# Patient Record
Sex: MALE | Race: BLACK OR AFRICAN AMERICAN | NOT HISPANIC OR LATINO | Employment: OTHER | URBAN - METROPOLITAN AREA
[De-identification: names, ages, dates, MRNs, and addresses within clinical notes are randomized per-mention and may not be internally consistent; named-entity substitution may affect disease eponyms.]

---

## 2023-03-23 DIAGNOSIS — I10 ESSENTIAL HYPERTENSION, BENIGN: ICD-10-CM

## 2023-03-23 RX ORDER — LOSARTAN POTASSIUM 100 MG/1
100 TABLET ORAL DAILY
COMMUNITY
Start: 2013-03-07 | End: 2023-03-23 | Stop reason: SDUPTHER

## 2023-03-24 RX ORDER — LOSARTAN POTASSIUM 100 MG/1
100 TABLET ORAL DAILY
Qty: 90 TABLET | Refills: 3 | Status: SHIPPED | OUTPATIENT
Start: 2023-03-24

## 2023-05-22 DIAGNOSIS — E78.5 HYPERLIPIDEMIA, UNSPECIFIED HYPERLIPIDEMIA TYPE: Primary | ICD-10-CM

## 2023-05-24 RX ORDER — ROSUVASTATIN CALCIUM 5 MG/1
5 TABLET, COATED ORAL DAILY
Qty: 90 TABLET | Refills: 2 | Status: SHIPPED | OUTPATIENT
Start: 2023-05-24 | End: 2024-03-13 | Stop reason: SDUPTHER

## 2023-09-24 DIAGNOSIS — E11.9 TYPE 2 DIABETES MELLITUS WITHOUT COMPLICATION, WITHOUT LONG-TERM CURRENT USE OF INSULIN (MULTI): ICD-10-CM

## 2023-09-24 RX ORDER — BLOOD-GLUCOSE METER
EACH MISCELLANEOUS
COMMUNITY
End: 2023-09-24 | Stop reason: SDUPTHER

## 2023-09-25 RX ORDER — BLOOD-GLUCOSE METER
EACH MISCELLANEOUS
Qty: 300 STRIP | Refills: 0 | Status: SHIPPED | OUTPATIENT
Start: 2023-09-25 | End: 2023-12-29 | Stop reason: SDUPTHER

## 2023-10-05 ENCOUNTER — TELEPHONE (OUTPATIENT)
Dept: ENDOCRINOLOGY | Facility: CLINIC | Age: 73
End: 2023-10-05
Payer: MEDICARE

## 2023-10-05 DIAGNOSIS — E11.9 TYPE 2 DIABETES MELLITUS WITHOUT COMPLICATION, WITHOUT LONG-TERM CURRENT USE OF INSULIN (MULTI): Primary | ICD-10-CM

## 2023-10-06 ENCOUNTER — LAB (OUTPATIENT)
Dept: LAB | Facility: LAB | Age: 73
End: 2023-10-06
Payer: MEDICARE

## 2023-10-06 DIAGNOSIS — E11.9 TYPE 2 DIABETES MELLITUS WITHOUT COMPLICATION, WITHOUT LONG-TERM CURRENT USE OF INSULIN (MULTI): ICD-10-CM

## 2023-10-06 LAB
ALBUMIN SERPL BCP-MCNC: 4.2 G/DL (ref 3.4–5)
ALP SERPL-CCNC: 71 U/L (ref 33–136)
ALT SERPL W P-5'-P-CCNC: 15 U/L (ref 10–52)
ANION GAP SERPL CALC-SCNC: 15 MMOL/L (ref 10–20)
AST SERPL W P-5'-P-CCNC: 22 U/L (ref 9–39)
BILIRUB SERPL-MCNC: 0.6 MG/DL (ref 0–1.2)
BUN SERPL-MCNC: 20 MG/DL (ref 6–23)
CALCIUM SERPL-MCNC: 9.1 MG/DL (ref 8.6–10.6)
CHLORIDE SERPL-SCNC: 108 MMOL/L (ref 98–107)
CO2 SERPL-SCNC: 24 MMOL/L (ref 21–32)
CREAT SERPL-MCNC: 1.54 MG/DL (ref 0.5–1.3)
EST. AVERAGE GLUCOSE BLD GHB EST-MCNC: 140 MG/DL
GFR SERPL CREATININE-BSD FRML MDRD: 47 ML/MIN/1.73M*2
GLUCOSE SERPL-MCNC: 80 MG/DL (ref 74–99)
HBA1C MFR BLD: 6.5 %
POTASSIUM SERPL-SCNC: 4.2 MMOL/L (ref 3.5–5.3)
PROT SERPL-MCNC: 7.3 G/DL (ref 6.4–8.2)
SODIUM SERPL-SCNC: 143 MMOL/L (ref 136–145)

## 2023-10-06 PROCEDURE — 83036 HEMOGLOBIN GLYCOSYLATED A1C: CPT

## 2023-10-06 PROCEDURE — 36415 COLL VENOUS BLD VENIPUNCTURE: CPT

## 2023-10-06 PROCEDURE — 80053 COMPREHEN METABOLIC PANEL: CPT

## 2023-10-09 PROBLEM — E11.3299 MILD NONPROLIFERATIVE DIABETIC RETINOPATHY (MULTI): Status: ACTIVE | Noted: 2023-10-09

## 2023-10-09 PROBLEM — K92.1 BLOOD IN STOOL: Status: ACTIVE | Noted: 2023-10-09

## 2023-10-09 PROBLEM — E55.9 VITAMIN D DEFICIENCY, UNSPECIFIED: Status: ACTIVE | Noted: 2023-10-09

## 2023-10-09 PROBLEM — D64.9 ANEMIA, UNSPECIFIED: Status: ACTIVE | Noted: 2023-10-09

## 2023-10-09 PROBLEM — K21.9 CHRONIC GERD: Status: ACTIVE | Noted: 2023-10-09

## 2023-10-09 PROBLEM — N18.30 CKD STAGE 3 DUE TO TYPE 2 DIABETES MELLITUS (MULTI): Status: ACTIVE | Noted: 2023-10-09

## 2023-10-09 PROBLEM — N25.81 SECONDARY RENAL HYPERPARATHYROIDISM (MULTI): Status: ACTIVE | Noted: 2023-10-09

## 2023-10-09 PROBLEM — R97.20 BPH WITH ELEVATED PSA: Status: ACTIVE | Noted: 2023-10-09

## 2023-10-09 PROBLEM — R94.31 ABNORMAL FINDING ON EKG: Status: ACTIVE | Noted: 2023-10-09

## 2023-10-09 PROBLEM — E11.9 DIABETES MELLITUS (MULTI): Status: ACTIVE | Noted: 2023-10-09

## 2023-10-09 PROBLEM — N40.1 ENLARGED PROSTATE WITH LOWER URINARY TRACT SYMPTOMS (LUTS): Status: ACTIVE | Noted: 2023-10-09

## 2023-10-09 PROBLEM — R74.01 ELEVATED TRANSAMINASE LEVEL: Status: ACTIVE | Noted: 2023-10-09

## 2023-10-09 PROBLEM — D17.0 LIPOMA OF NECK: Status: ACTIVE | Noted: 2023-10-09

## 2023-10-09 PROBLEM — I10 BENIGN ESSENTIAL HYPERTENSION: Status: ACTIVE | Noted: 2023-10-09

## 2023-10-09 PROBLEM — M10.9 GOUT: Status: ACTIVE | Noted: 2023-10-09

## 2023-10-09 PROBLEM — D47.2 MONOCLONAL (M) PROTEIN DISEASE, MULTIPLE 'M' PROTEIN: Status: ACTIVE | Noted: 2023-10-09

## 2023-10-09 PROBLEM — N40.0 BPH WITH ELEVATED PSA: Status: ACTIVE | Noted: 2023-10-09

## 2023-10-09 PROBLEM — E66.01 MORBIDLY OBESE (MULTI): Status: ACTIVE | Noted: 2023-10-09

## 2023-10-09 PROBLEM — E78.5 HYPERLIPIDEMIA: Status: ACTIVE | Noted: 2023-10-09

## 2023-10-09 PROBLEM — B96.81 HELICOBACTER PYLORI GASTRITIS: Status: ACTIVE | Noted: 2023-10-09

## 2023-10-09 PROBLEM — E11.22 CKD STAGE 3 DUE TO TYPE 2 DIABETES MELLITUS (MULTI): Status: ACTIVE | Noted: 2023-10-09

## 2023-10-09 PROBLEM — C61 ADENOCARCINOMA OF PROSTATE (MULTI): Status: ACTIVE | Noted: 2023-10-09

## 2023-10-09 PROBLEM — K29.70 HELICOBACTER PYLORI GASTRITIS: Status: ACTIVE | Noted: 2023-10-09

## 2023-10-09 RX ORDER — IRON POLYSACCHARIDE COMPLEX 150 MG
150 CAPSULE ORAL DAILY
COMMUNITY
Start: 2023-09-06 | End: 2024-01-30 | Stop reason: SDUPTHER

## 2023-10-09 RX ORDER — SELENIUM 200 MCG
TABLET ORAL
COMMUNITY
Start: 2020-08-31

## 2023-10-09 RX ORDER — CYANOCOBALAMIN (VITAMIN B-12) 500 MCG
1 TABLET ORAL DAILY
COMMUNITY
Start: 2020-07-06

## 2023-10-09 RX ORDER — LATANOPROST 50 UG/ML
SOLUTION/ DROPS OPHTHALMIC
COMMUNITY
Start: 2017-02-13

## 2023-10-09 RX ORDER — EMPAGLIFLOZIN 10 MG/1
1 TABLET, FILM COATED ORAL DAILY
COMMUNITY
Start: 2022-01-31 | End: 2023-11-20

## 2023-10-09 RX ORDER — LISINOPRIL 20 MG/1
20 TABLET ORAL DAILY
COMMUNITY
Start: 2009-05-05 | End: 2023-10-10

## 2023-10-09 RX ORDER — NAPROXEN SODIUM 220 MG/1
81 TABLET, FILM COATED ORAL DAILY
COMMUNITY

## 2023-10-09 RX ORDER — DULAGLUTIDE 1.5 MG/.5ML
1.5 INJECTION, SOLUTION SUBCUTANEOUS
COMMUNITY
Start: 2019-05-02 | End: 2024-05-02 | Stop reason: SDUPTHER

## 2023-10-09 RX ORDER — METFORMIN HYDROCHLORIDE 500 MG/1
500 TABLET ORAL 2 TIMES DAILY
COMMUNITY
Start: 2009-05-05

## 2023-10-09 RX ORDER — ALLOPURINOL 100 MG/1
2 TABLET ORAL DAILY
COMMUNITY
Start: 2016-11-02 | End: 2023-12-27

## 2023-10-09 RX ORDER — ACETAMINOPHEN 500 MG
TABLET ORAL
COMMUNITY

## 2023-10-09 RX ORDER — BRIMONIDINE TARTRATE 2 MG/ML
1 SOLUTION/ DROPS OPHTHALMIC 2 TIMES DAILY
COMMUNITY

## 2023-10-09 RX ORDER — LANCETS 33 GAUGE
EACH MISCELLANEOUS 3 TIMES DAILY
COMMUNITY
Start: 2022-11-02

## 2023-10-09 RX ORDER — ZINC GLUCONATE 50 MG
TABLET ORAL
COMMUNITY

## 2023-10-09 RX ORDER — LANOLIN ALCOHOL/MO/W.PET/CERES
1 CREAM (GRAM) TOPICAL DAILY
COMMUNITY
End: 2024-01-30 | Stop reason: SDUPTHER

## 2023-10-09 RX ORDER — GLIPIZIDE 10 MG/1
15 TABLET ORAL
COMMUNITY
Start: 2009-05-05 | End: 2023-10-10 | Stop reason: ALTCHOICE

## 2023-10-09 RX ORDER — DORZOLAMIDE HYDROCHLORIDE AND TIMOLOL MALEATE 20; 5 MG/ML; MG/ML
SOLUTION/ DROPS OPHTHALMIC
COMMUNITY
Start: 2018-03-20

## 2023-10-09 RX ORDER — DICLOFENAC SODIUM 75 MG/1
75 TABLET, DELAYED RELEASE ORAL 2 TIMES DAILY PRN
COMMUNITY
Start: 2017-09-03 | End: 2023-10-10 | Stop reason: ALTCHOICE

## 2023-10-09 RX ORDER — GLIPIZIDE 5 MG/1
1 TABLET ORAL DAILY
COMMUNITY
Start: 2012-03-29 | End: 2024-03-13

## 2023-10-10 ENCOUNTER — OFFICE VISIT (OUTPATIENT)
Dept: ENDOCRINOLOGY | Facility: CLINIC | Age: 73
End: 2023-10-10
Payer: MEDICARE

## 2023-10-10 VITALS
SYSTOLIC BLOOD PRESSURE: 124 MMHG | WEIGHT: 212.8 LBS | DIASTOLIC BLOOD PRESSURE: 64 MMHG | BODY MASS INDEX: 31.52 KG/M2 | HEART RATE: 57 BPM | RESPIRATION RATE: 14 BRPM | HEIGHT: 69 IN

## 2023-10-10 DIAGNOSIS — I10 BENIGN ESSENTIAL HYPERTENSION: ICD-10-CM

## 2023-10-10 DIAGNOSIS — E78.5 HYPERLIPIDEMIA, UNSPECIFIED HYPERLIPIDEMIA TYPE: ICD-10-CM

## 2023-10-10 DIAGNOSIS — E11.9 TYPE 2 DIABETES MELLITUS WITHOUT COMPLICATION, WITHOUT LONG-TERM CURRENT USE OF INSULIN (MULTI): Primary | ICD-10-CM

## 2023-10-10 PROCEDURE — 3074F SYST BP LT 130 MM HG: CPT | Performed by: INTERNAL MEDICINE

## 2023-10-10 PROCEDURE — 99214 OFFICE O/P EST MOD 30 MIN: CPT | Performed by: INTERNAL MEDICINE

## 2023-10-10 PROCEDURE — 1159F MED LIST DOCD IN RCRD: CPT | Performed by: INTERNAL MEDICINE

## 2023-10-10 PROCEDURE — 4010F ACE/ARB THERAPY RXD/TAKEN: CPT | Performed by: INTERNAL MEDICINE

## 2023-10-10 PROCEDURE — 1160F RVW MEDS BY RX/DR IN RCRD: CPT | Performed by: INTERNAL MEDICINE

## 2023-10-10 PROCEDURE — 1126F AMNT PAIN NOTED NONE PRSNT: CPT | Performed by: INTERNAL MEDICINE

## 2023-10-10 PROCEDURE — 3078F DIAST BP <80 MM HG: CPT | Performed by: INTERNAL MEDICINE

## 2023-10-10 PROCEDURE — 3044F HG A1C LEVEL LT 7.0%: CPT | Performed by: INTERNAL MEDICINE

## 2023-10-10 PROCEDURE — 1036F TOBACCO NON-USER: CPT | Performed by: INTERNAL MEDICINE

## 2023-10-10 ASSESSMENT — ENCOUNTER SYMPTOMS
VOMITING: 0
SHORTNESS OF BREATH: 0
NAUSEA: 0
DIZZINESS: 0
LIGHT-HEADEDNESS: 0
FEVER: 0
CHILLS: 0
DIARRHEA: 0

## 2023-10-10 NOTE — PATIENT INSTRUCTIONS
Keep up the good work  No change to meds  Follow up in 6 months  Call with any concerns regarding sugars or meds

## 2023-10-10 NOTE — PROGRESS NOTES
"Subjective   Patient ID: Ismael Haley is a 72 y.o. male who presents for Diabetes, Hyperlipidemia, and Hypertension.  HPI  Since last visit in January doing great.   Sugars excellent.  One low but was explainable.  UTD with eye exam.  Feels well.  No issues with meds    Review of Systems   Constitutional:  Negative for chills and fever.   Respiratory:  Negative for shortness of breath.    Gastrointestinal:  Negative for diarrhea, nausea and vomiting.   Endocrine: Negative for cold intolerance and heat intolerance.   Neurological:  Negative for dizziness and light-headedness.       Objective     10/10/2023 12:02 PM    /64   Pulse 57   Resp 14   Weight 96.5 kg (212 lb 12.8 oz)   Height 1.753 m (5' 9\")       Physical Exam  Constitutional:       Appearance: Normal appearance. He is overweight.   HENT:      Head: Normocephalic and atraumatic.   Neck:      Thyroid: No thyroid mass, thyromegaly or thyroid tenderness.   Cardiovascular:      Rate and Rhythm: Normal rate and regular rhythm.      Heart sounds: No murmur heard.     No gallop.   Pulmonary:      Effort: Pulmonary effort is normal.      Breath sounds: Normal breath sounds.   Abdominal:      Palpations: Abdomen is soft.      Comments: benign   Neurological:      General: No focal deficit present.      Mental Status: He is alert and oriented to person, place, and time.      Deep Tendon Reflexes: Reflexes are normal and symmetric.   Psychiatric:         Behavior: Behavior is cooperative.         Assessment/Plan   Problem List Items Addressed This Visit             ICD-10-CM    Benign essential hypertension I10    Diabetes mellitus (CMS/[facility]) - Primary E11.9    Relevant Orders    Comprehensive Metabolic Panel    CBC    Lipid Panel    Hemoglobin A1C    Albumin , Urine Random    Hyperlipidemia E78.5     Dm2:  A1c excellent and cr stable.   Continue current meds  UTD with eye exam  Htn: bp under excellent control  Hyperlipidemia:  Continue statin, lipids due next " time  Follow up in 6 months

## 2023-10-17 ENCOUNTER — APPOINTMENT (OUTPATIENT)
Dept: PRIMARY CARE | Facility: CLINIC | Age: 73
End: 2023-10-17
Payer: MEDICARE

## 2023-10-24 ENCOUNTER — OFFICE VISIT (OUTPATIENT)
Dept: PRIMARY CARE | Facility: CLINIC | Age: 73
End: 2023-10-24
Payer: MEDICARE

## 2023-10-24 VITALS
HEIGHT: 70 IN | BODY MASS INDEX: 30.06 KG/M2 | DIASTOLIC BLOOD PRESSURE: 75 MMHG | HEART RATE: 52 BPM | WEIGHT: 210 LBS | SYSTOLIC BLOOD PRESSURE: 127 MMHG

## 2023-10-24 DIAGNOSIS — I10 BENIGN ESSENTIAL HYPERTENSION: ICD-10-CM

## 2023-10-24 DIAGNOSIS — E66.01 MORBIDLY OBESE (MULTI): ICD-10-CM

## 2023-10-24 DIAGNOSIS — C61 ADENOCARCINOMA OF PROSTATE (MULTI): ICD-10-CM

## 2023-10-24 DIAGNOSIS — E11.49 TYPE 2 DIABETES MELLITUS WITH OTHER NEUROLOGIC COMPLICATION, WITHOUT LONG-TERM CURRENT USE OF INSULIN (MULTI): Primary | Chronic | ICD-10-CM

## 2023-10-24 DIAGNOSIS — D47.2 MONOCLONAL (M) PROTEIN DISEASE, MULTIPLE 'M' PROTEIN: ICD-10-CM

## 2023-10-24 DIAGNOSIS — E78.5 HYPERLIPIDEMIA, UNSPECIFIED HYPERLIPIDEMIA TYPE: ICD-10-CM

## 2023-10-24 DIAGNOSIS — N25.81 SECONDARY RENAL HYPERPARATHYROIDISM (MULTI): ICD-10-CM

## 2023-10-24 DIAGNOSIS — Z09 FOLLOW-UP EXAM: ICD-10-CM

## 2023-10-24 DIAGNOSIS — N18.30 CKD STAGE 3 DUE TO TYPE 2 DIABETES MELLITUS (MULTI): ICD-10-CM

## 2023-10-24 DIAGNOSIS — E11.22 CKD STAGE 3 DUE TO TYPE 2 DIABETES MELLITUS (MULTI): ICD-10-CM

## 2023-10-24 DIAGNOSIS — E11.3299 MILD NONPROLIFERATIVE DIABETIC RETINOPATHY WITHOUT MACULAR EDEMA ASSOCIATED WITH TYPE 2 DIABETES MELLITUS, UNSPECIFIED LATERALITY (MULTI): ICD-10-CM

## 2023-10-24 LAB
CHOLEST SERPL-MCNC: 122 MG/DL (ref 0–199)
CHOLESTEROL/HDL RATIO: 3.2
CREAT UR-MCNC: 110.5 MG/DL (ref 20–370)
HDLC SERPL-MCNC: 38.4 MG/DL
LDLC SERPL CALC-MCNC: 64 MG/DL
MICROALBUMIN UR-MCNC: <7 MG/L
MICROALBUMIN/CREAT UR: NORMAL MG/G{CREAT}
NON HDL CHOLESTEROL: 84 MG/DL (ref 0–149)
TRIGL SERPL-MCNC: 96 MG/DL (ref 0–149)
VLDL: 19 MG/DL (ref 0–40)

## 2023-10-24 PROCEDURE — 1159F MED LIST DOCD IN RCRD: CPT | Performed by: INTERNAL MEDICINE

## 2023-10-24 PROCEDURE — 36415 COLL VENOUS BLD VENIPUNCTURE: CPT

## 2023-10-24 PROCEDURE — 99214 OFFICE O/P EST MOD 30 MIN: CPT | Performed by: INTERNAL MEDICINE

## 2023-10-24 PROCEDURE — 82570 ASSAY OF URINE CREATININE: CPT

## 2023-10-24 PROCEDURE — 3044F HG A1C LEVEL LT 7.0%: CPT | Performed by: INTERNAL MEDICINE

## 2023-10-24 PROCEDURE — 90662 IIV NO PRSV INCREASED AG IM: CPT | Performed by: INTERNAL MEDICINE

## 2023-10-24 PROCEDURE — 4010F ACE/ARB THERAPY RXD/TAKEN: CPT | Performed by: INTERNAL MEDICINE

## 2023-10-24 PROCEDURE — 3066F NEPHROPATHY DOC TX: CPT | Performed by: INTERNAL MEDICINE

## 2023-10-24 PROCEDURE — 80061 LIPID PANEL: CPT

## 2023-10-24 PROCEDURE — 1126F AMNT PAIN NOTED NONE PRSNT: CPT | Performed by: INTERNAL MEDICINE

## 2023-10-24 PROCEDURE — 3078F DIAST BP <80 MM HG: CPT | Performed by: INTERNAL MEDICINE

## 2023-10-24 PROCEDURE — 1160F RVW MEDS BY RX/DR IN RCRD: CPT | Performed by: INTERNAL MEDICINE

## 2023-10-24 PROCEDURE — 82043 UR ALBUMIN QUANTITATIVE: CPT

## 2023-10-24 PROCEDURE — G0008 ADMIN INFLUENZA VIRUS VAC: HCPCS | Performed by: INTERNAL MEDICINE

## 2023-10-24 PROCEDURE — 1036F TOBACCO NON-USER: CPT | Performed by: INTERNAL MEDICINE

## 2023-10-24 PROCEDURE — 3074F SYST BP LT 130 MM HG: CPT | Performed by: INTERNAL MEDICINE

## 2023-10-24 ASSESSMENT — PATIENT HEALTH QUESTIONNAIRE - PHQ9
1. LITTLE INTEREST OR PLEASURE IN DOING THINGS: NOT AT ALL
2. FEELING DOWN, DEPRESSED OR HOPELESS: NOT AT ALL
1. LITTLE INTEREST OR PLEASURE IN DOING THINGS: NOT AT ALL
2. FEELING DOWN, DEPRESSED OR HOPELESS: NOT AT ALL
SUM OF ALL RESPONSES TO PHQ9 QUESTIONS 1 AND 2: 0
SUM OF ALL RESPONSES TO PHQ9 QUESTIONS 1 AND 2: 0

## 2023-10-24 ASSESSMENT — ENCOUNTER SYMPTOMS
CONSTITUTIONAL NEGATIVE: 1
OCCASIONAL FEELINGS OF UNSTEADINESS: 0
DEPRESSION: 0
LOSS OF SENSATION IN FEET: 0
NUMBNESS: 1

## 2023-10-24 ASSESSMENT — COLUMBIA-SUICIDE SEVERITY RATING SCALE - C-SSRS
1. IN THE PAST MONTH, HAVE YOU WISHED YOU WERE DEAD OR WISHED YOU COULD GO TO SLEEP AND NOT WAKE UP?: NO
2. HAVE YOU ACTUALLY HAD ANY THOUGHTS OF KILLING YOURSELF?: NO
6. HAVE YOU EVER DONE ANYTHING, STARTED TO DO ANYTHING, OR PREPARED TO DO ANYTHING TO END YOUR LIFE?: NO

## 2023-10-24 NOTE — PROGRESS NOTES
"Patient ID: Ismael Haley is a 72 y.o. male who presents for Follow-up (Wants flu shot).    /75 (BP Location: Left arm, Patient Position: Sitting)   Pulse 52   Ht 1.778 m (5' 10\")   Wt 95.3 kg (210 lb)   BMI 30.13 kg/m²     HPI      Patient here for follow-up  No complaint , no concern     Subjective     Review of Systems   Constitutional: Negative.    Neurological:  Positive for numbness.        Tingling and numbness in feet    All other systems reviewed and are negative.      Objective     Physical Exam  Vitals and nursing note reviewed.   Neck:      Vascular: No carotid bruit.   Cardiovascular:      Rate and Rhythm: Normal rate and regular rhythm.   Pulmonary:      Effort: Pulmonary effort is normal.      Breath sounds: Normal breath sounds. No wheezing.   Abdominal:      General: Abdomen is flat.      Palpations: Abdomen is soft.   Lymphadenopathy:      Cervical: No cervical adenopathy.   Neurological:      General: No focal deficit present.      Mental Status: He is oriented to person, place, and time. Mental status is at baseline.   Psychiatric:         Mood and Affect: Mood normal.         Behavior: Behavior normal.         Thought Content: Thought content normal.         Judgment: Judgment normal.         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)     Diabetes stable on insulin         Diabetes mellitus (CMS/HCC) - Primary    Relevant Orders    Albumin, urine, random    Hyperlipidemia    Relevant Orders    Lipid panel    Monoclonal (M) protein disease, multiple 'M' protein    Morbidly obese (CMS/HCC)     Discussed with the pt , the effect of morbid obesity and overall all " cause mortality  Discussed with the patient of morbid obesity with his physical mental wellbeing  Told him to cut back total calorie intake total portion size  Need to eat more healthy cut back carb, to take more fruits and vegetable  Offered to to see dietitian patient deferred         Secondary renal hyperparathyroidism (CMS/HCC)     stable         Mild nonproliferative diabetic retinopathy (CMS/[facility])    Follow-up exam             A/P         He will get urine microalbumin, lipid  He is going to get high-dose influenza vaccine  Follow-up if needed 6 months time

## 2023-10-24 NOTE — ASSESSMENT & PLAN NOTE
Discussed with the pt , the effect of morbid obesity and overall all cause mortality  Discussed with the patient of morbid obesity with his physical mental wellbeing  Told him to cut back total calorie intake total portion size  Need to eat more healthy cut back carb, to take more fruits and vegetable  Offered to to see dietitian patient deferred

## 2023-10-30 ENCOUNTER — OFFICE VISIT (OUTPATIENT)
Dept: UROLOGY | Facility: HOSPITAL | Age: 73
End: 2023-10-30
Payer: MEDICARE

## 2023-10-30 ENCOUNTER — LAB (OUTPATIENT)
Dept: LAB | Facility: LAB | Age: 73
End: 2023-10-30
Payer: MEDICARE

## 2023-10-30 DIAGNOSIS — C61 MALIGNANT NEOPLASM OF PROSTATE (MULTI): ICD-10-CM

## 2023-10-30 PROCEDURE — 1159F MED LIST DOCD IN RCRD: CPT | Performed by: STUDENT IN AN ORGANIZED HEALTH CARE EDUCATION/TRAINING PROGRAM

## 2023-10-30 PROCEDURE — 99213 OFFICE O/P EST LOW 20 MIN: CPT | Performed by: STUDENT IN AN ORGANIZED HEALTH CARE EDUCATION/TRAINING PROGRAM

## 2023-10-30 PROCEDURE — 84153 ASSAY OF PSA TOTAL: CPT

## 2023-10-30 PROCEDURE — 3078F DIAST BP <80 MM HG: CPT | Performed by: STUDENT IN AN ORGANIZED HEALTH CARE EDUCATION/TRAINING PROGRAM

## 2023-10-30 PROCEDURE — 3066F NEPHROPATHY DOC TX: CPT | Performed by: STUDENT IN AN ORGANIZED HEALTH CARE EDUCATION/TRAINING PROGRAM

## 2023-10-30 PROCEDURE — 3062F POS MACROALBUMINURIA REV: CPT | Performed by: STUDENT IN AN ORGANIZED HEALTH CARE EDUCATION/TRAINING PROGRAM

## 2023-10-30 PROCEDURE — 3048F LDL-C <100 MG/DL: CPT | Performed by: STUDENT IN AN ORGANIZED HEALTH CARE EDUCATION/TRAINING PROGRAM

## 2023-10-30 PROCEDURE — 3044F HG A1C LEVEL LT 7.0%: CPT | Performed by: STUDENT IN AN ORGANIZED HEALTH CARE EDUCATION/TRAINING PROGRAM

## 2023-10-30 PROCEDURE — 36415 COLL VENOUS BLD VENIPUNCTURE: CPT

## 2023-10-30 PROCEDURE — 1160F RVW MEDS BY RX/DR IN RCRD: CPT | Performed by: STUDENT IN AN ORGANIZED HEALTH CARE EDUCATION/TRAINING PROGRAM

## 2023-10-30 PROCEDURE — 4010F ACE/ARB THERAPY RXD/TAKEN: CPT | Performed by: STUDENT IN AN ORGANIZED HEALTH CARE EDUCATION/TRAINING PROGRAM

## 2023-10-30 PROCEDURE — 1036F TOBACCO NON-USER: CPT | Performed by: STUDENT IN AN ORGANIZED HEALTH CARE EDUCATION/TRAINING PROGRAM

## 2023-10-30 PROCEDURE — 1126F AMNT PAIN NOTED NONE PRSNT: CPT | Performed by: STUDENT IN AN ORGANIZED HEALTH CARE EDUCATION/TRAINING PROGRAM

## 2023-10-30 PROCEDURE — 3074F SYST BP LT 130 MM HG: CPT | Performed by: STUDENT IN AN ORGANIZED HEALTH CARE EDUCATION/TRAINING PROGRAM

## 2023-10-31 LAB — PSA SERPL-MCNC: 0.12 NG/ML

## 2023-10-31 NOTE — PROGRESS NOTES
UROLOGIC FOLLOW-UP VISIT     PROBLEM LIST:  1.   1. Malignant neoplasm of prostate (CMS/HCC)  PSA           HISTORY OF PRESENT ILLNESS:     73 y/o male with hx of prostate cancer s/p ADT and EBRT, PSA levels have been undetectable. Patient presents today for surveillance appt. Most recent PSA in 04/2023 was undetectable. Denies any complaints of urgency, frequency or incomplete emptying of the bladder. He has no complaints of nocturia. He has no complaints of recent weight loss, no bone pain and no recent onset of fatigue.     PAST MEDICAL HISTORY:  Past Medical History:   Diagnosis Date    Essential (primary) hypertension 02/05/2014    Benign essential hypertension    Gastritis, unspecified, without bleeding 03/25/2021    Helicobacter pylori gastritis    Other conditions influencing health status     Calcific Bursitis    Other conditions influencing health status 03/11/2021    Uncontrolled diabetes with kidney complications    Personal history of other diseases of the musculoskeletal system and connective tissue     Personal history of gout    Personal history of other diseases of the musculoskeletal system and connective tissue 10/19/2016    History of gout    Personal history of other endocrine, nutritional and metabolic disease     History of diabetes mellitus       PAST SURGICAL HISTORY:  Past Surgical History:   Procedure Laterality Date    ELBOW SURGERY  10/11/2013    Elbow Surgery    OTHER SURGICAL HISTORY  03/07/2019    Finger surgical procedure        ALLERGIES:   Allergies   Allergen Reactions    Lisinopril Angioedema and Swelling     Swelling of lips        MEDICATIONS:     Current Outpatient Medications:     allopurinol (Zyloprim) 100 mg tablet, Take 2 tablets (200 mg) by mouth once daily., Disp: , Rfl:     amLODIPine (Norvasc) 10 mg tablet, TAKE 1 TABLET DAILY, Disp: 90 tablet, Rfl: 0    ascorbic acid (Vitamin C) 100 mg tablet, Vitamin C 100 MG Oral Tablet Refills: 0, Disp: , Rfl:     aspirin 81 mg  chewable tablet, Chew 1 tablet (81 mg) once daily., Disp: , Rfl:     blood sugar diagnostic (OneTouch Verio test strips) strip, USE AS DIRECTED TO TEST THREE TIMES DAILY, Disp: 300 strip, Rfl: 0    brimonidine (AlphaGAN P) 0.2 % ophthalmic solution, Administer 1 drop into the right eye 2 times a day., Disp: , Rfl:     cholecalciferol (Vitamin D3) 50 mcg (2,000 unit) capsule, Vitamin D CAPS Refills: 0, Disp: , Rfl:     cyanocobalamin (Vitamin B-12) 1,000 mcg tablet, Take 1 tablet (1,000 mcg) by mouth once daily., Disp: , Rfl:     dorzolamide-timoloL (Cosopt) 22.3-6.8 mg/mL ophthalmic solution, Dorzolamide HCl-Timolol Mal 22.3-6.8 MG/ML Ophthalmic Solution Quantity: 20  Refills: 0  Start: 20-Mar-2018, Disp: , Rfl:     folic acid 0.8 mg capsule, Take 1 capsule by mouth once daily., Disp: , Rfl:     glipiZIDE (Glucotrol) 5 mg tablet, Take 1 tablet (5 mg) by mouth once daily., Disp: , Rfl:     iron polysaccharides (Nu-Iron,Niferex) 150 mg iron capsule, Take 1 capsule (150 mg) by mouth once daily., Disp: , Rfl:     Jardiance 10 mg, Take 1 tablet (10 mg) by mouth once daily., Disp: , Rfl:     lancets 33 gauge misc, 3 times a day.  USE TO TEST BLOOD SUGAR THREE TIMES DAILY, Disp: , Rfl:     latanoprost (Xalatan) 0.005 % ophthalmic solution, Latanoprost 0.005 % Ophthalmic Solution Quantity: 7  Refills: 0  Start: 13-Feb-2017, Disp: , Rfl:     losartan (Cozaar) 100 mg tablet, Take 1 tablet (100 mg) by mouth once daily., Disp: 90 tablet, Rfl: 3    metFORMIN (Glucophage) 500 mg tablet, Take 1 tablet (500 mg) by mouth 2 times a day., Disp: , Rfl:     multivit-min/folic/vit K/lycop (MEN'S MULTIVITAMIN ORAL), Take 1 tablet by mouth once daily., Disp: , Rfl:     rosuvastatin (Crestor) 5 mg tablet, Take 1 tablet (5 mg) by mouth once daily., Disp: 90 tablet, Rfl: 2    selenium 200 mcg tablet, Take by mouth.  TAKE AS DIRECTED., Disp: , Rfl:     Trulicity 1.5 mg/0.5 mL pen injector injection, Inject 1.5 mg under the skin 1 (one) time  per week., Disp: , Rfl:     zinc gluconate 50 mg tablet, Zinc 50 MG Oral Tablet Refills: 0, Disp: , Rfl:       SOCIAL HISTORY:  Patient  reports that he has never smoked. He has never used smokeless tobacco.   Social History     Socioeconomic History    Marital status:      Spouse name: Not on file    Number of children: Not on file    Years of education: Not on file    Highest education level: Not on file   Occupational History    Not on file   Tobacco Use    Smoking status: Never    Smokeless tobacco: Never   Substance and Sexual Activity    Alcohol use: Not on file    Drug use: Not on file    Sexual activity: Not on file   Other Topics Concern    Not on file   Social History Narrative    Not on file     Social Determinants of Health     Financial Resource Strain: Not on file   Food Insecurity: Not on file   Transportation Needs: Not on file   Physical Activity: Not on file   Stress: Not on file   Social Connections: Not on file   Intimate Partner Violence: Not on file   Housing Stability: Not on file       FAMILY HISTORY:  Family History   Problem Relation Name Age of Onset    Pneumonia Mother      Other (cardiac failure) Father      Hypertension Father      Other (ESRD) Brother      Diabetes Other multiple family members     Hypertension Other multiple family members        REVIEW OF SYSTEMS:  Constitutional: Negative for fever and chills. Denies anorexia, weight loss.  Eyes: Negative for visual disturbance.   Respiratory: Negative for shortness of breath.    Cardiovascular: Negative for chest pain.   Gastrointestinal: Negative for nausea and vomiting.   Genitourinary: See interval history above.  Skin: Negative for rash.   Neurological: Negative for dizziness and numbness.   Psychiatric/Behavioral: Negative for confusion and decreased concentration.     PHYSICAL EXAM:  There were no vitals taken for this visit.  Constitutional: Patient appears well-developed and well-nourished. No distress.    Head:  Normocephalic and atraumatic.    Neck: Normal range of motion.    Cardiovascular: Normal rate.    Pulmonary/Chest: Effort normal. No respiratory distress.   Abdominal: soft NTND  Musculoskeletal: Normal range of motion.    Neurological: Alert and oriented to person, place, and time.  Psychiatric: Normal mood and affect. Behavior is normal. Thought content normal.      Lab Results   Component Value Date    BUN 20 10/06/2023    CREATININE 1.54 (H) 10/06/2023    EGFR 47 (L) 10/06/2023     10/06/2023    K 4.2 10/06/2023     (H) 10/06/2023    CO2 24 10/06/2023    CALCIUM 9.1 10/06/2023      Lab Results   Component Value Date    WBC 4.2 (L) 01/30/2023    RBC 5.89 01/30/2023    HGB 15.6 01/30/2023    HCT 50.3 01/30/2023    MCV 85 01/30/2023    MCHC 31.0 (L) 01/30/2023    RDW 17.7 (H) 01/30/2023     01/30/2023        Lab Results   Component Value Date    PSA 0.12 10/30/2023    PSA <0.10 04/17/2023    PSA <0.10 09/24/2022    PSA <0.10 04/12/2022    PSA <0.10 01/06/2022    PSA <0.10 09/07/2021    PSA <0.10 03/08/2021    PSA <0.10 11/06/2020    PSA <0.10 06/30/2020    PSA <0.10 04/22/2020    PSA <0.10 01/14/2020    PSA <0.10 11/13/2019    PSA <0.10 08/19/2019    PSA <0.10 05/24/2019    PSA <0.10 03/06/2019    PSA 0.91 12/10/2018    PSA 21.74 (H) 09/05/2018         Assessment:      1. Malignant neoplasm of prostate (CMS/HCC)  PSA          73 y/o male with hx of prostate cancer s/p ADT and EBRT      Plan:    PSA levels have been undetectable    Check PSA today   RTC in 6m with PSA level    20 minutes total spent on patient's care today; >50% time spent on counseling/coordination of care

## 2023-11-17 DIAGNOSIS — I10 HYPERTENSION, UNSPECIFIED TYPE: ICD-10-CM

## 2023-11-17 RX ORDER — AMLODIPINE BESYLATE 10 MG/1
10 TABLET ORAL DAILY
Qty: 90 TABLET | Refills: 3 | Status: SHIPPED | OUTPATIENT
Start: 2023-11-17

## 2023-11-20 DIAGNOSIS — Z86.010 PERSONAL HISTORY OF COLONIC POLYPS: Primary | ICD-10-CM

## 2023-11-20 DIAGNOSIS — E11.9 TYPE 2 DIABETES MELLITUS WITHOUT COMPLICATION, WITHOUT LONG-TERM CURRENT USE OF INSULIN (MULTI): Primary | ICD-10-CM

## 2023-11-20 RX ORDER — POLYETHYLENE GLYCOL 3350, SODIUM SULFATE ANHYDROUS, SODIUM BICARBONATE, SODIUM CHLORIDE, POTASSIUM CHLORIDE 236; 22.74; 6.74; 5.86; 2.97 G/4L; G/4L; G/4L; G/4L; G/4L
4000 POWDER, FOR SOLUTION ORAL ONCE
Qty: 4000 ML | Refills: 0 | Status: SHIPPED | OUTPATIENT
Start: 2023-11-20 | End: 2023-11-20

## 2023-11-20 RX ORDER — EMPAGLIFLOZIN 10 MG/1
10 TABLET, FILM COATED ORAL DAILY
Qty: 90 TABLET | Refills: 3 | Status: SHIPPED | OUTPATIENT
Start: 2023-11-20

## 2023-12-26 DIAGNOSIS — E79.0 HYPERURICEMIA: Primary | ICD-10-CM

## 2023-12-27 RX ORDER — ALLOPURINOL 100 MG/1
200 TABLET ORAL DAILY
Qty: 180 TABLET | Refills: 3 | Status: SHIPPED | OUTPATIENT
Start: 2023-12-27

## 2023-12-29 DIAGNOSIS — E11.9 TYPE 2 DIABETES MELLITUS WITHOUT COMPLICATION, WITHOUT LONG-TERM CURRENT USE OF INSULIN (MULTI): ICD-10-CM

## 2023-12-29 RX ORDER — BLOOD-GLUCOSE METER
EACH MISCELLANEOUS
Qty: 300 STRIP | Refills: 0 | Status: SHIPPED | OUTPATIENT
Start: 2023-12-29 | End: 2024-03-15

## 2024-01-10 ENCOUNTER — OFFICE VISIT (OUTPATIENT)
Dept: GASTROENTEROLOGY | Facility: EXTERNAL LOCATION | Age: 74
End: 2024-01-10
Payer: MEDICARE

## 2024-01-10 DIAGNOSIS — E11.9 TYPE 2 DIABETES MELLITUS WITHOUT COMPLICATION, WITHOUT LONG-TERM CURRENT USE OF INSULIN (MULTI): ICD-10-CM

## 2024-01-10 DIAGNOSIS — K57.30 DIVERTICULOSIS OF LARGE INTESTINE WITHOUT DIVERTICULITIS: ICD-10-CM

## 2024-01-10 DIAGNOSIS — D12.0 BENIGN NEOPLASM OF CECUM: ICD-10-CM

## 2024-01-10 DIAGNOSIS — Z86.010 PERSONAL HISTORY OF COLONIC POLYPS: ICD-10-CM

## 2024-01-10 DIAGNOSIS — Z12.11 SCREEN FOR COLON CANCER: Primary | ICD-10-CM

## 2024-01-10 DIAGNOSIS — I10 ESSENTIAL HYPERTENSION, BENIGN: ICD-10-CM

## 2024-01-10 DIAGNOSIS — D12.2 BENIGN NEOPLASM OF ASCENDING COLON: ICD-10-CM

## 2024-01-10 PROCEDURE — 45385 COLONOSCOPY W/LESION REMOVAL: CPT | Performed by: INTERNAL MEDICINE

## 2024-01-10 PROCEDURE — 4010F ACE/ARB THERAPY RXD/TAKEN: CPT | Performed by: INTERNAL MEDICINE

## 2024-01-10 PROCEDURE — 88305 TISSUE EXAM BY PATHOLOGIST: CPT

## 2024-01-10 PROCEDURE — 1036F TOBACCO NON-USER: CPT | Performed by: INTERNAL MEDICINE

## 2024-01-10 PROCEDURE — 3066F NEPHROPATHY DOC TX: CPT | Performed by: INTERNAL MEDICINE

## 2024-01-10 PROCEDURE — 88305 TISSUE EXAM BY PATHOLOGIST: CPT | Performed by: PATHOLOGY

## 2024-01-10 PROCEDURE — 1160F RVW MEDS BY RX/DR IN RCRD: CPT | Performed by: INTERNAL MEDICINE

## 2024-01-10 PROCEDURE — 1126F AMNT PAIN NOTED NONE PRSNT: CPT | Performed by: INTERNAL MEDICINE

## 2024-01-11 ENCOUNTER — LAB REQUISITION (OUTPATIENT)
Dept: LAB | Facility: HOSPITAL | Age: 74
End: 2024-01-11
Payer: MEDICARE

## 2024-01-16 ENCOUNTER — APPOINTMENT (OUTPATIENT)
Dept: HEMATOLOGY/ONCOLOGY | Facility: CLINIC | Age: 74
End: 2024-01-16
Payer: MEDICARE

## 2024-01-16 LAB
LABORATORY COMMENT REPORT: NORMAL
PATH REPORT.FINAL DX SPEC: NORMAL
PATH REPORT.GROSS SPEC: NORMAL
PATH REPORT.RELEVANT HX SPEC: NORMAL
PATH REPORT.TOTAL CANCER: NORMAL

## 2024-01-26 ENCOUNTER — TELEPHONE (OUTPATIENT)
Dept: HEMATOLOGY/ONCOLOGY | Facility: CLINIC | Age: 74
End: 2024-01-26
Payer: MEDICARE

## 2024-01-28 DIAGNOSIS — D50.9 IRON DEFICIENCY ANEMIA, UNSPECIFIED IRON DEFICIENCY ANEMIA TYPE: ICD-10-CM

## 2024-01-28 DIAGNOSIS — K90.9 IDIOPATHIC STEATORRHEA (HHS-HCC): ICD-10-CM

## 2024-01-28 DIAGNOSIS — D47.2 MONOCLONAL (M) PROTEIN DISEASE, MULTIPLE 'M' PROTEIN: Primary | ICD-10-CM

## 2024-01-29 ENCOUNTER — LAB (OUTPATIENT)
Dept: LAB | Facility: LAB | Age: 74
End: 2024-01-29
Payer: MEDICARE

## 2024-01-29 DIAGNOSIS — D50.9 IRON DEFICIENCY ANEMIA, UNSPECIFIED IRON DEFICIENCY ANEMIA TYPE: ICD-10-CM

## 2024-01-29 DIAGNOSIS — K90.9 IDIOPATHIC STEATORRHEA (HHS-HCC): ICD-10-CM

## 2024-01-29 DIAGNOSIS — D47.2 MONOCLONAL (M) PROTEIN DISEASE, MULTIPLE 'M' PROTEIN: ICD-10-CM

## 2024-01-29 LAB
ALBUMIN SERPL BCP-MCNC: 4 G/DL (ref 3.4–5)
ALP SERPL-CCNC: 62 U/L (ref 33–136)
ALT SERPL W P-5'-P-CCNC: 12 U/L (ref 10–52)
ANION GAP SERPL CALC-SCNC: 13 MMOL/L (ref 10–20)
AST SERPL W P-5'-P-CCNC: 17 U/L (ref 9–39)
BASOPHILS # BLD AUTO: 0.03 X10*3/UL (ref 0–0.1)
BASOPHILS NFR BLD AUTO: 0.8 %
BILIRUB SERPL-MCNC: 0.3 MG/DL (ref 0–1.2)
BUN SERPL-MCNC: 21 MG/DL (ref 6–23)
CALCIUM SERPL-MCNC: 9.3 MG/DL (ref 8.6–10.6)
CHLORIDE SERPL-SCNC: 107 MMOL/L (ref 98–107)
CO2 SERPL-SCNC: 26 MMOL/L (ref 21–32)
CREAT SERPL-MCNC: 1.55 MG/DL (ref 0.5–1.3)
EGFRCR SERPLBLD CKD-EPI 2021: 46 ML/MIN/1.73M*2
EOSINOPHIL # BLD AUTO: 0.2 X10*3/UL (ref 0–0.4)
EOSINOPHIL NFR BLD AUTO: 5.7 %
ERYTHROCYTE [DISTWIDTH] IN BLOOD BY AUTOMATED COUNT: 16.5 % (ref 11.5–14.5)
FERRITIN SERPL-MCNC: 136 NG/ML (ref 20–300)
GLUCOSE SERPL-MCNC: 126 MG/DL (ref 74–99)
HCT VFR BLD AUTO: 49.1 % (ref 41–52)
HGB BLD-MCNC: 15.4 G/DL (ref 13.5–17.5)
IGA SERPL-MCNC: 502 MG/DL (ref 70–400)
IGG SERPL-MCNC: 1150 MG/DL (ref 700–1600)
IGM SERPL-MCNC: 49 MG/DL (ref 40–230)
IMM GRANULOCYTES # BLD AUTO: 0.01 X10*3/UL (ref 0–0.5)
IMM GRANULOCYTES NFR BLD AUTO: 0.3 % (ref 0–0.9)
IRON SATN MFR SERPL: 18 % (ref 25–45)
IRON SERPL-MCNC: 59 UG/DL (ref 35–150)
LYMPHOCYTES # BLD AUTO: 1.61 X10*3/UL (ref 0.8–3)
LYMPHOCYTES NFR BLD AUTO: 45.6 %
MCH RBC QN AUTO: 27 PG (ref 26–34)
MCHC RBC AUTO-ENTMCNC: 31.4 G/DL (ref 32–36)
MCV RBC AUTO: 86 FL (ref 80–100)
MONOCYTES # BLD AUTO: 0.38 X10*3/UL (ref 0.05–0.8)
MONOCYTES NFR BLD AUTO: 10.8 %
NEUTROPHILS # BLD AUTO: 1.3 X10*3/UL (ref 1.6–5.5)
NEUTROPHILS NFR BLD AUTO: 36.8 %
NRBC BLD-RTO: 0 /100 WBCS (ref 0–0)
PLATELET # BLD AUTO: 192 X10*3/UL (ref 150–450)
POTASSIUM SERPL-SCNC: 4.5 MMOL/L (ref 3.5–5.3)
PROT SERPL-MCNC: 7 G/DL (ref 6.4–8.2)
PROT SERPL-MCNC: 7 G/DL (ref 6.4–8.2)
RBC # BLD AUTO: 5.71 X10*6/UL (ref 4.5–5.9)
SODIUM SERPL-SCNC: 141 MMOL/L (ref 136–145)
TIBC SERPL-MCNC: 326 UG/DL (ref 240–445)
UIBC SERPL-MCNC: 267 UG/DL (ref 110–370)
VIT B12 SERPL-MCNC: 386 PG/ML (ref 211–911)
WBC # BLD AUTO: 3.5 X10*3/UL (ref 4.4–11.3)

## 2024-01-29 PROCEDURE — 82728 ASSAY OF FERRITIN: CPT

## 2024-01-29 PROCEDURE — 84165 PROTEIN E-PHORESIS SERUM: CPT

## 2024-01-29 PROCEDURE — 86334 IMMUNOFIX E-PHORESIS SERUM: CPT

## 2024-01-29 PROCEDURE — 86320 SERUM IMMUNOELECTROPHORESIS: CPT | Performed by: PHYSICIAN ASSISTANT

## 2024-01-29 PROCEDURE — 83521 IG LIGHT CHAINS FREE EACH: CPT

## 2024-01-29 PROCEDURE — 80053 COMPREHEN METABOLIC PANEL: CPT

## 2024-01-29 PROCEDURE — 36415 COLL VENOUS BLD VENIPUNCTURE: CPT

## 2024-01-29 PROCEDURE — 84155 ASSAY OF PROTEIN SERUM: CPT

## 2024-01-29 PROCEDURE — 83540 ASSAY OF IRON: CPT

## 2024-01-29 PROCEDURE — 83550 IRON BINDING TEST: CPT

## 2024-01-29 PROCEDURE — 85025 COMPLETE CBC W/AUTO DIFF WBC: CPT

## 2024-01-29 PROCEDURE — 82784 ASSAY IGA/IGD/IGG/IGM EACH: CPT

## 2024-01-29 PROCEDURE — 84165 PROTEIN E-PHORESIS SERUM: CPT | Performed by: PHYSICIAN ASSISTANT

## 2024-01-29 PROCEDURE — 82607 VITAMIN B-12: CPT

## 2024-01-30 ENCOUNTER — OFFICE VISIT (OUTPATIENT)
Dept: HEMATOLOGY/ONCOLOGY | Facility: CLINIC | Age: 74
End: 2024-01-30
Payer: MEDICARE

## 2024-01-30 VITALS
BODY MASS INDEX: 31.54 KG/M2 | SYSTOLIC BLOOD PRESSURE: 137 MMHG | TEMPERATURE: 98.1 F | DIASTOLIC BLOOD PRESSURE: 87 MMHG | HEIGHT: 69 IN | RESPIRATION RATE: 16 BRPM | WEIGHT: 212.96 LBS | HEART RATE: 67 BPM | OXYGEN SATURATION: 96 %

## 2024-01-30 DIAGNOSIS — E53.8 VITAMIN B12 DEFICIENCY: ICD-10-CM

## 2024-01-30 DIAGNOSIS — K90.9 IDIOPATHIC STEATORRHEA (HHS-HCC): ICD-10-CM

## 2024-01-30 DIAGNOSIS — D64.9 ANEMIA, UNSPECIFIED TYPE: ICD-10-CM

## 2024-01-30 DIAGNOSIS — D47.2 MONOCLONAL (M) PROTEIN DISEASE, MULTIPLE 'M' PROTEIN: Primary | ICD-10-CM

## 2024-01-30 LAB
KAPPA LC SERPL-MCNC: 4.81 MG/DL (ref 0.33–1.94)
KAPPA LC/LAMBDA SER: 1.31 {RATIO} (ref 0.26–1.65)
LAMBDA LC SERPL-MCNC: 3.68 MG/DL (ref 0.57–2.63)

## 2024-01-30 PROCEDURE — 3079F DIAST BP 80-89 MM HG: CPT | Performed by: PHYSICIAN ASSISTANT

## 2024-01-30 PROCEDURE — 3075F SYST BP GE 130 - 139MM HG: CPT | Performed by: PHYSICIAN ASSISTANT

## 2024-01-30 PROCEDURE — 1036F TOBACCO NON-USER: CPT | Performed by: PHYSICIAN ASSISTANT

## 2024-01-30 PROCEDURE — 4010F ACE/ARB THERAPY RXD/TAKEN: CPT | Performed by: PHYSICIAN ASSISTANT

## 2024-01-30 PROCEDURE — 99214 OFFICE O/P EST MOD 30 MIN: CPT | Performed by: PHYSICIAN ASSISTANT

## 2024-01-30 PROCEDURE — 1126F AMNT PAIN NOTED NONE PRSNT: CPT | Performed by: PHYSICIAN ASSISTANT

## 2024-01-30 PROCEDURE — 1159F MED LIST DOCD IN RCRD: CPT | Performed by: PHYSICIAN ASSISTANT

## 2024-01-30 PROCEDURE — 3066F NEPHROPATHY DOC TX: CPT | Performed by: PHYSICIAN ASSISTANT

## 2024-01-30 RX ORDER — IRON POLYSACCHARIDE COMPLEX 150 MG
150 CAPSULE ORAL DAILY
Qty: 90 CAPSULE | Refills: 3 | Status: SHIPPED | OUTPATIENT
Start: 2024-01-30

## 2024-01-30 RX ORDER — LANOLIN ALCOHOL/MO/W.PET/CERES
1000 CREAM (GRAM) TOPICAL DAILY
Qty: 90 TABLET | Refills: 3 | Status: SHIPPED | OUTPATIENT
Start: 2024-01-30

## 2024-01-30 ASSESSMENT — PAIN SCALES - GENERAL: PAINLEVEL: 0-NO PAIN

## 2024-01-30 NOTE — PROGRESS NOTES
Patient Visit Information:   Visit Type: Follow Up Visit     Cancer History:    Prostate   AJCC Edition: 8th (AJCC), Diagnosis Date: 13-Aug-2018, Stage (iPSA 28, Gl 4+3), T2c NX M0     Treatment Synopsis:    69-year-old -American male was referred by Dr. Ricks for evaluation of a mild normocytic anemia and IgA monoclonal protein per SPEP    In February and May 2019. CBC showed hemoglobin between 12.0-12.3 with a normal MCV at 84. Patient has no other cytopenias. SPEP showed IgA lamda at 0.1g/dl    he was dxed with prostate cancer in 2018 s/p radiation tx last dose in 1/2019. received lupron before the radiation tx and continued for planned 2 year. PSA was <0.1 in 8/2019.    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    he was started on folic acid, vit b12, and oral iron.    PMH: prostate cancer, DM, CKD, vit D deficiency, HTN    PSH: elbow surgery, finger surgery  FH: brother had colon cancer in his 70's.        History of Present Illness:   Patient presents for follow up. Overall doing well. In good spirits w/no complaints. Denies any urinary symptoms. Energy and appetite good. No longer taking B12 and iron supplements. Denies fever, chills, night sweats, unintentional weight loss, bleeding, or any other complaints at this time.     Review of Systems:    All of the systems have been reviewed and are negative for complaints except what is stated in the HPI and/or past medical history    Allergies and Intolerances:       Allergies:   lisinopril: Drug, Facial Swelling, Active  Current Outpatient Medications on File Prior to Visit   Medication Sig Dispense Refill    allopurinol (Zyloprim) 100 mg tablet TAKE 2 TABLETS DAILY 180 tablet 3    amLODIPine (Norvasc) 10 mg tablet TAKE 1 TABLET DAILY 90 tablet 3    ascorbic acid (Vitamin C) 100 mg tablet Vitamin C 100 MG Oral Tablet  Refills: 0      aspirin 81 mg chewable tablet Chew 1 tablet (81 mg) once daily.      blood sugar diagnostic (OneTouch  Verio test strips) strip USE AS DIRECTED TO TEST THREE TIMES DAILY 300 strip 0    brimonidine (AlphaGAN P) 0.2 % ophthalmic solution Administer 1 drop into the right eye 2 times a day.      cholecalciferol (Vitamin D3) 50 mcg (2,000 unit) capsule Vitamin D CAPS  Refills: 0      dorzolamide-timoloL (Cosopt) 22.3-6.8 mg/mL ophthalmic solution Dorzolamide HCl-Timolol Mal 22.3-6.8 MG/ML Ophthalmic Solution  Quantity: 20   Refills: 0  Start: 20-Mar-2018      folic acid 0.8 mg capsule Take 1 capsule by mouth once daily.      glipiZIDE (Glucotrol) 5 mg tablet Take 1 tablet (5 mg) by mouth once daily.      Jardiance 10 mg TAKE 1 TABLET DAILY 90 tablet 3    lancets 33 gauge misc 3 times a day.  USE TO TEST BLOOD SUGAR THREE TIMES DAILY      latanoprost (Xalatan) 0.005 % ophthalmic solution Latanoprost 0.005 % Ophthalmic Solution  Quantity: 7   Refills: 0  Start: 13-Feb-2017      losartan (Cozaar) 100 mg tablet Take 1 tablet (100 mg) by mouth once daily. 90 tablet 3    metFORMIN (Glucophage) 500 mg tablet Take 1 tablet (500 mg) by mouth 2 times a day.      multivit-min/folic/vit K/lycop (MEN'S MULTIVITAMIN ORAL) Take 1 tablet by mouth once daily.      rosuvastatin (Crestor) 5 mg tablet Take 1 tablet (5 mg) by mouth once daily. 90 tablet 2    selenium 200 mcg tablet Take by mouth.  TAKE AS DIRECTED.      Trulicity 1.5 mg/0.5 mL pen injector injection Inject 1.5 mg under the skin 1 (one) time per week.      zinc gluconate 50 mg tablet Zinc 50 MG Oral Tablet  Refills: 0      [DISCONTINUED] cyanocobalamin (Vitamin B-12) 1,000 mcg tablet Take 1 tablet (1,000 mcg) by mouth once daily.      [DISCONTINUED] iron polysaccharides (Nu-Iron,Niferex) 150 mg iron capsule Take 1 capsule (150 mg) by mouth once daily.       No current facility-administered medications on file prior to visit.              Medical History:   History of radiation therapy: ICD-10: Z92.3, Status: Active   Androgen deprivation therapy: ICD-10: Z79.818, Status:  Active   Malignant neoplasm of prostate: ICD-10: C61, Status: Active    Family History: No Family History items are recorded in the problem list.     Social History:   Social Substance History:  · Smoking Status former smoker (1)   · Additional History    Soc:   x 40yrs with 3 children and 7 GC.  Podiatrist, retired in .  Spends time at the [employer], exercises regularly.  Never Tob, rare ETOH.  grew up in [place].  Fam Hx:  M-  of pna at 48yo.  F-  of MI at 65. 3 brothers and 1 sis  between ages 40-70; non cancer related.   (1)    Vitals:    24 0911   BP: 137/87   Pulse: 67   Resp: 16   Temp: 36.7 °C (98.1 °F)   SpO2: 96%     Physical Exam:   Constitutional: alert, awake and oriented, not in acute distress   HEENT: moist mucous membranes, normal nose   Neck: supple, no lymphadenopathy   EYES: PERRL, EOM intact, conjunctiva normal  Skin: no jaundice, rash or erythema  Neurological: AAOx3, no gross focal deficit   Psychiatric: normal mood and behavior     Labs:  Lab Results   Component Value Date    WBC 3.5 (L) 2024    NEUTROABS 1.30 (L) 2024    IGABSOL 0.01 2024    LYMPHSABS 1.61 2024    MONOSABS 0.38 2024    EOSABS 0.20 2024    BASOSABS 0.03 2024    RBC 5.71 2024    MCV 86 2024    MCHC 31.4 (L) 2024    HGB 15.4 2024    HCT 49.1 2024     2024     Lab Results   Component Value Date    RETICCTPCT 1.4 2019      Lab Results   Component Value Date    CREATININE 1.55 (H) 2024    BUN 21 2024    EGFR 46 (L) 2024     2024    K 4.5 2024     2024    CO2 26 2024      Lab Results   Component Value Date    ALT 12 2024    AST 17 2024    ALKPHOS 62 2024    BILITOT 0.3 2024      Lab Results   Component Value Date    TSH 1.64 2019     Lab Results   Component Value Date    TSH 1.64 2019     Lab Results   Component Value Date    IRON 59  "01/29/2024    TIBC 326 01/29/2024    FERRITIN 136 01/29/2024      Lab Results   Component Value Date    KTATRELG72 386 01/29/2024      Lab Results   Component Value Date    FOLATE >24.0 01/14/2023     Lab Results   Component Value Date    SEDRATE 11 09/03/2019      Lab Results   Component Value Date    CRP 0.22 09/03/2019      No results found for: \"JESÚS\"  Lab Results   Component Value Date     09/03/2019     Lab Results   Component Value Date    HAPTOGLOBIN 89 09/03/2019     Lab Results   Component Value Date    SPEP ABNORMAL 01/14/2023     Lab Results   Component Value Date    IGG 1,150 01/29/2024    IGM 49 01/29/2024     (H) 01/29/2024     Assessment:    69 -year-old -American male presented with mild normocytic anemia and IgA lambda monoclonal protein per SPEP, which likely secondary to monoclonal gammopathy of undetermined significance.     anemia multifactorial could be related to recent radiation for prostate cancer and/or borderline vitamin B12, folate and iron deficiency    plasma cell dyscrasia, stable m protein    anemia likely secondary to TASIA and bone marrow supression from prior radiation as well as ADT    borderline folic acid and b12 deficiency     chronic renal insuf, stable    prostate cancer  Plan:    Reviewed and discussed lab results with patient in detail as well as diagnosis, prognosis, and treatment options.    Continue to monitor MGUS q 6 months     no need for immediate intervention at this time.    Recommend to restart B12, folic acid, and oral iron supplementation. Rx sent.     f/u with urology    f/u w/PCP    Patient verbalized understanding, and all his questions were answered to his satisfaction    Labs in 6 months and RTC in 12 months with labs or sooner as needed   "

## 2024-01-31 LAB
ALBUMIN: 4.1 G/DL (ref 3.4–5)
ALPHA 1 GLOBULIN: 0.3 G/DL (ref 0.2–0.6)
ALPHA 2 GLOBULIN: 0.6 G/DL (ref 0.4–1.1)
BETA GLOBULIN: 1.1 G/DL (ref 0.5–1.2)
GAMMA GLOBULIN: 1 G/DL (ref 0.5–1.4)
IMMUNOFIXATION COMMENT: ABNORMAL
M-PROTEIN 1: 0.2 G/DL
PATH REVIEW - SERUM IMMUNOFIXATION: ABNORMAL
PATH REVIEW-SERUM PROTEIN ELECTROPHORESIS: ABNORMAL
PROTEIN ELECTROPHORESIS COMMENT: ABNORMAL

## 2024-03-13 DIAGNOSIS — E11.9 TYPE 2 DIABETES MELLITUS WITHOUT COMPLICATION, WITHOUT LONG-TERM CURRENT USE OF INSULIN (MULTI): Primary | ICD-10-CM

## 2024-03-13 DIAGNOSIS — E78.5 HYPERLIPIDEMIA, UNSPECIFIED HYPERLIPIDEMIA TYPE: ICD-10-CM

## 2024-03-13 DIAGNOSIS — E11.9 TYPE 2 DIABETES MELLITUS WITHOUT COMPLICATION, WITHOUT LONG-TERM CURRENT USE OF INSULIN (MULTI): ICD-10-CM

## 2024-03-13 RX ORDER — GLIPIZIDE 5 MG/1
5 TABLET ORAL DAILY
Qty: 90 TABLET | Refills: 1 | Status: SHIPPED | OUTPATIENT
Start: 2024-03-13

## 2024-03-15 RX ORDER — ROSUVASTATIN CALCIUM 5 MG/1
5 TABLET, COATED ORAL DAILY
Qty: 90 TABLET | Refills: 2 | Status: SHIPPED | OUTPATIENT
Start: 2024-03-15

## 2024-03-15 RX ORDER — BLOOD-GLUCOSE METER
EACH MISCELLANEOUS
Qty: 300 STRIP | Refills: 1 | Status: SHIPPED | OUTPATIENT
Start: 2024-03-15

## 2024-04-10 ENCOUNTER — APPOINTMENT (OUTPATIENT)
Dept: ENDOCRINOLOGY | Facility: CLINIC | Age: 74
End: 2024-04-10
Payer: MEDICARE

## 2024-04-18 ENCOUNTER — TELEPHONE (OUTPATIENT)
Dept: PRIMARY CARE | Facility: CLINIC | Age: 74
End: 2024-04-18
Payer: MEDICARE

## 2024-04-18 DIAGNOSIS — E11.49 TYPE 2 DIABETES MELLITUS WITH OTHER NEUROLOGIC COMPLICATION, WITHOUT LONG-TERM CURRENT USE OF INSULIN (MULTI): Primary | ICD-10-CM

## 2024-04-29 ENCOUNTER — LAB (OUTPATIENT)
Dept: LAB | Facility: LAB | Age: 74
End: 2024-04-29
Payer: MEDICARE

## 2024-04-29 ENCOUNTER — OFFICE VISIT (OUTPATIENT)
Dept: UROLOGY | Facility: HOSPITAL | Age: 74
End: 2024-04-29
Payer: MEDICARE

## 2024-04-29 DIAGNOSIS — E11.49 TYPE 2 DIABETES MELLITUS WITH OTHER NEUROLOGIC COMPLICATION, WITHOUT LONG-TERM CURRENT USE OF INSULIN (MULTI): ICD-10-CM

## 2024-04-29 DIAGNOSIS — C61 MALIGNANT NEOPLASM OF PROSTATE (MULTI): Primary | ICD-10-CM

## 2024-04-29 DIAGNOSIS — C61 MALIGNANT NEOPLASM OF PROSTATE (MULTI): ICD-10-CM

## 2024-04-29 LAB
ALBUMIN SERPL BCP-MCNC: 4.2 G/DL (ref 3.4–5)
ALP SERPL-CCNC: 69 U/L (ref 33–136)
ALT SERPL W P-5'-P-CCNC: 13 U/L (ref 10–52)
ANION GAP SERPL CALC-SCNC: 11 MMOL/L (ref 10–20)
AST SERPL W P-5'-P-CCNC: 21 U/L (ref 9–39)
BILIRUB SERPL-MCNC: 0.5 MG/DL (ref 0–1.2)
BUN SERPL-MCNC: 20 MG/DL (ref 6–23)
CALCIUM SERPL-MCNC: 8.8 MG/DL (ref 8.6–10.3)
CHLORIDE SERPL-SCNC: 108 MMOL/L (ref 98–107)
CO2 SERPL-SCNC: 26 MMOL/L (ref 21–32)
CREAT SERPL-MCNC: 1.46 MG/DL (ref 0.5–1.3)
EGFRCR SERPLBLD CKD-EPI 2021: 50 ML/MIN/1.73M*2
EST. AVERAGE GLUCOSE BLD GHB EST-MCNC: 143 MG/DL
GLUCOSE SERPL-MCNC: 116 MG/DL (ref 74–99)
HBA1C MFR BLD: 6.6 %
POTASSIUM SERPL-SCNC: 4.2 MMOL/L (ref 3.5–5.3)
PROT SERPL-MCNC: 7.3 G/DL (ref 6.4–8.2)
PSA SERPL-MCNC: <0.1 NG/ML
SODIUM SERPL-SCNC: 141 MMOL/L (ref 136–145)

## 2024-04-29 PROCEDURE — 3044F HG A1C LEVEL LT 7.0%: CPT | Performed by: STUDENT IN AN ORGANIZED HEALTH CARE EDUCATION/TRAINING PROGRAM

## 2024-04-29 PROCEDURE — 1159F MED LIST DOCD IN RCRD: CPT | Performed by: STUDENT IN AN ORGANIZED HEALTH CARE EDUCATION/TRAINING PROGRAM

## 2024-04-29 PROCEDURE — 4010F ACE/ARB THERAPY RXD/TAKEN: CPT | Performed by: STUDENT IN AN ORGANIZED HEALTH CARE EDUCATION/TRAINING PROGRAM

## 2024-04-29 PROCEDURE — 99213 OFFICE O/P EST LOW 20 MIN: CPT | Performed by: STUDENT IN AN ORGANIZED HEALTH CARE EDUCATION/TRAINING PROGRAM

## 2024-04-29 PROCEDURE — 84153 ASSAY OF PSA TOTAL: CPT

## 2024-04-29 PROCEDURE — 83036 HEMOGLOBIN GLYCOSYLATED A1C: CPT

## 2024-04-29 PROCEDURE — 80053 COMPREHEN METABOLIC PANEL: CPT

## 2024-04-29 PROCEDURE — 36415 COLL VENOUS BLD VENIPUNCTURE: CPT

## 2024-04-29 NOTE — PROGRESS NOTES
UROLOGIC FOLLOW-UP VISIT     PROBLEM LIST:  1. Malignant neoplasm of prostate (Multi)  PSA    PSA           HISTORY OF PRESENT ILLNESS:   73 y/o male with hx of prostate cancer s/p ADT and EBRT, PSA levels have been undetectable. Patient presents today for surveillance appt. Most recent PSA in 010/30/23 was 0.12. Denies any complaints of urgency, frequency or incomplete emptying of the bladder. He has no complaints of nocturia. He has no complaints of recent weight loss, no bone pain and no recent onset of fatigue.     PAST MEDICAL HISTORY:  Past Medical History:   Diagnosis Date    Essential (primary) hypertension 02/05/2014    Benign essential hypertension    Gastritis, unspecified, without bleeding 03/25/2021    Helicobacter pylori gastritis    Other conditions influencing health status     Calcific Bursitis    Other conditions influencing health status 03/11/2021    Uncontrolled diabetes with kidney complications    Personal history of other diseases of the musculoskeletal system and connective tissue     Personal history of gout    Personal history of other diseases of the musculoskeletal system and connective tissue 10/19/2016    History of gout    Personal history of other endocrine, nutritional and metabolic disease     History of diabetes mellitus       PAST SURGICAL HISTORY:  Past Surgical History:   Procedure Laterality Date    ELBOW SURGERY  10/11/2013    Elbow Surgery    OTHER SURGICAL HISTORY  03/07/2019    Finger surgical procedure        ALLERGIES:   Allergies   Allergen Reactions    Lisinopril Angioedema and Swelling     Swelling of lips        MEDICATIONS:     Current Outpatient Medications:     glipiZIDE (Glucotrol) 5 mg tablet, TAKE 1 TABLET BY MOUTH EVERY DAY, Disp: 90 tablet, Rfl: 1    allopurinol (Zyloprim) 100 mg tablet, TAKE 2 TABLETS DAILY, Disp: 180 tablet, Rfl: 3    amLODIPine (Norvasc) 10 mg tablet, TAKE 1 TABLET DAILY, Disp: 90 tablet, Rfl: 3    ascorbic acid (Vitamin C) 100 mg  tablet, Vitamin C 100 MG Oral Tablet Refills: 0, Disp: , Rfl:     aspirin 81 mg chewable tablet, Chew 1 tablet (81 mg) once daily., Disp: , Rfl:     brimonidine (AlphaGAN P) 0.2 % ophthalmic solution, Administer 1 drop into the right eye 2 times a day., Disp: , Rfl:     cholecalciferol (Vitamin D3) 50 mcg (2,000 unit) capsule, Vitamin D CAPS Refills: 0, Disp: , Rfl:     cyanocobalamin (Vitamin B-12) 1,000 mcg tablet, Take 1 tablet (1,000 mcg) by mouth once daily., Disp: 90 tablet, Rfl: 3    dorzolamide-timoloL (Cosopt) 22.3-6.8 mg/mL ophthalmic solution, Dorzolamide HCl-Timolol Mal 22.3-6.8 MG/ML Ophthalmic Solution Quantity: 20  Refills: 0  Start: 20-Mar-2018, Disp: , Rfl:     folic acid 0.8 mg capsule, Take 1 capsule by mouth once daily., Disp: , Rfl:     iron polysaccharides (Nu-Iron,Niferex) 150 mg iron capsule, Take 1 capsule (150 mg) by mouth once daily., Disp: 90 capsule, Rfl: 3    Jardiance 10 mg, TAKE 1 TABLET DAILY, Disp: 90 tablet, Rfl: 3    lancets 33 gauge misc, 3 times a day.  USE TO TEST BLOOD SUGAR THREE TIMES DAILY, Disp: , Rfl:     latanoprost (Xalatan) 0.005 % ophthalmic solution, Latanoprost 0.005 % Ophthalmic Solution Quantity: 7  Refills: 0  Start: 13-Feb-2017, Disp: , Rfl:     losartan (Cozaar) 100 mg tablet, Take 1 tablet (100 mg) by mouth once daily., Disp: 90 tablet, Rfl: 3    metFORMIN (Glucophage) 500 mg tablet, Take 1 tablet (500 mg) by mouth 2 times a day., Disp: , Rfl:     multivit-min/folic/vit K/lycop (MEN'S MULTIVITAMIN ORAL), Take 1 tablet by mouth once daily., Disp: , Rfl:     OneTouch Verio test strips strip, USE AS DIRECTED TO TEST THREE TIMES DAILY, Disp: 300 strip, Rfl: 1    rosuvastatin (Crestor) 5 mg tablet, Take 1 tablet (5 mg) by mouth once daily., Disp: 90 tablet, Rfl: 2    selenium 200 mcg tablet, Take by mouth.  TAKE AS DIRECTED., Disp: , Rfl:     Trulicity 1.5 mg/0.5 mL pen injector injection, Inject 1.5 mg under the skin 1 (one) time per week., Disp: , Rfl:     zinc  gluconate 50 mg tablet, Zinc 50 MG Oral Tablet Refills: 0, Disp: , Rfl:       SOCIAL HISTORY:  Patient  reports that he has never smoked. He has never used smokeless tobacco.   Social History     Socioeconomic History    Marital status:      Spouse name: Not on file    Number of children: Not on file    Years of education: Not on file    Highest education level: Not on file   Occupational History    Not on file   Tobacco Use    Smoking status: Never    Smokeless tobacco: Never   Substance and Sexual Activity    Alcohol use: Not on file    Drug use: Not on file    Sexual activity: Not on file   Other Topics Concern    Not on file   Social History Narrative    Not on file     Social Determinants of Health     Financial Resource Strain: Not on file   Food Insecurity: Not on file   Transportation Needs: Not on file   Physical Activity: Not on file   Stress: Not on file   Social Connections: Not on file   Intimate Partner Violence: Not on file   Housing Stability: Not on file       FAMILY HISTORY:  Family History   Problem Relation Name Age of Onset    Pneumonia Mother      Other (cardiac failure) Father      Hypertension Father      Other (ESRD) Brother      Diabetes Other multiple family members     Hypertension Other multiple family members      REVIEW OF SYSTEMS:   Constitutional: Negative for fever and chills. Denies anorexia, weight loss.  Eyes: Negative for visual disturbance.   Respiratory: Negative for shortness of breath.    Cardiovascular: Negative for chest pain.   Gastrointestinal: Negative for nausea and vomiting.   Genitourinary: See interval history above.  Skin: Negative for rash.   Neurological: Negative for dizziness and numbness.   Psychiatric/Behavioral: Negative for confusion and decreased concentration.     PHYSICAL EXAM:  There were no vitals taken for this visit.  Constitutional: Patient appears well-developed and well-nourished. No distress.    Head: Normocephalic and atraumatic.    Neck:  Normal range of motion.    Cardiovascular: Normal rate.    Pulmonary/Chest: Effort normal. No respiratory distress.   Abdominal: soft NTND  Musculoskeletal: Normal range of motion.    Neurological: Alert and oriented to person, place, and time.  Psychiatric: Normal mood and affect. Behavior is normal. Thought content normal.      LABORATORY REVIEW:     Lab Results   Component Value Date    BUN 21 01/29/2024    CREATININE 1.55 (H) 01/29/2024    EGFR 46 (L) 01/29/2024     01/29/2024    K 4.5 01/29/2024     01/29/2024    CO2 26 01/29/2024    CALCIUM 9.3 01/29/2024      Lab Results   Component Value Date    WBC 3.5 (L) 01/29/2024    RBC 5.71 01/29/2024    HGB 15.4 01/29/2024    HCT 49.1 01/29/2024    MCV 86 01/29/2024    MCH 27.0 01/29/2024    MCHC 31.4 (L) 01/29/2024    RDW 16.5 (H) 01/29/2024     01/29/2024        Lab Results   Component Value Date    PSA 0.12 10/30/2023    PSA <0.10 04/17/2023    PSA <0.10 09/24/2022    PSA <0.10 04/12/2022    PSA <0.10 01/06/2022    PSA <0.10 09/07/2021    PSA <0.10 03/08/2021    PSA <0.10 11/06/2020    PSA <0.10 06/30/2020    PSA <0.10 04/22/2020    PSA <0.10 01/14/2020    PSA <0.10 11/13/2019    PSA <0.10 08/19/2019    PSA <0.10 05/24/2019    PSA <0.10 03/06/2019    PSA 0.91 12/10/2018    PSA 21.74 (H) 09/05/2018          Assessment:      1. Malignant neoplasm of prostate (Multi)  PSA    PSA         Plan:    Reviewed and interpreted patient's PSA level    Counseled patient that we can transition to annual PSA screening   Encouraged patient to continue managing his DM to protect his kidney function    25 minutes total spent on patient's care today; >50% time spent on counseling/coordination of care

## 2024-05-02 DIAGNOSIS — E11.9 TYPE 2 DIABETES MELLITUS WITHOUT COMPLICATION, WITHOUT LONG-TERM CURRENT USE OF INSULIN (MULTI): Primary | ICD-10-CM

## 2024-05-02 RX ORDER — DULAGLUTIDE 1.5 MG/.5ML
1.5 INJECTION, SOLUTION SUBCUTANEOUS
Qty: 6 ML | Refills: 3 | Status: SHIPPED | OUTPATIENT
Start: 2024-05-05 | End: 2025-05-05

## 2024-06-24 ENCOUNTER — APPOINTMENT (OUTPATIENT)
Dept: ENDOCRINOLOGY | Facility: CLINIC | Age: 74
End: 2024-06-24
Payer: MEDICARE

## 2024-06-24 VITALS
HEIGHT: 69 IN | WEIGHT: 210.2 LBS | DIASTOLIC BLOOD PRESSURE: 70 MMHG | HEART RATE: 54 BPM | RESPIRATION RATE: 16 BRPM | BODY MASS INDEX: 31.13 KG/M2 | SYSTOLIC BLOOD PRESSURE: 120 MMHG

## 2024-06-24 DIAGNOSIS — E78.5 HYPERLIPIDEMIA, UNSPECIFIED HYPERLIPIDEMIA TYPE: ICD-10-CM

## 2024-06-24 DIAGNOSIS — E11.9 TYPE 2 DIABETES MELLITUS WITHOUT COMPLICATION, WITHOUT LONG-TERM CURRENT USE OF INSULIN (MULTI): Primary | ICD-10-CM

## 2024-06-24 DIAGNOSIS — I10 BENIGN ESSENTIAL HYPERTENSION: ICD-10-CM

## 2024-06-24 PROCEDURE — 3044F HG A1C LEVEL LT 7.0%: CPT | Performed by: INTERNAL MEDICINE

## 2024-06-24 PROCEDURE — 3074F SYST BP LT 130 MM HG: CPT | Performed by: INTERNAL MEDICINE

## 2024-06-24 PROCEDURE — 3078F DIAST BP <80 MM HG: CPT | Performed by: INTERNAL MEDICINE

## 2024-06-24 PROCEDURE — 1160F RVW MEDS BY RX/DR IN RCRD: CPT | Performed by: INTERNAL MEDICINE

## 2024-06-24 PROCEDURE — 4010F ACE/ARB THERAPY RXD/TAKEN: CPT | Performed by: INTERNAL MEDICINE

## 2024-06-24 PROCEDURE — 99214 OFFICE O/P EST MOD 30 MIN: CPT | Performed by: INTERNAL MEDICINE

## 2024-06-24 PROCEDURE — 1159F MED LIST DOCD IN RCRD: CPT | Performed by: INTERNAL MEDICINE

## 2024-06-24 PROCEDURE — 1036F TOBACCO NON-USER: CPT | Performed by: INTERNAL MEDICINE

## 2024-06-24 ASSESSMENT — ENCOUNTER SYMPTOMS
LIGHT-HEADEDNESS: 0
CHILLS: 0
DIZZINESS: 0
NAUSEA: 0
VOMITING: 0
SHORTNESS OF BREATH: 0
FEVER: 0
DIARRHEA: 0

## 2024-06-24 NOTE — PROGRESS NOTES
Endocrinology: Follow up visit  Subjective   Patient ID: Ismael Haley is a 73 y.o. male who presents for Diabetes (Type 2 ), Hyperlipidemia, and Hypertension.    PCP: Zarina Ricks MD    HPI  Last seen 6 months ago.  Doing great with sugars.  On review of glucometer data sugars a bit low in afternoons.  Otherwise feeling great.   Weight down a bit.  Getting jardiance and trulicity through pt assistance    Review of Systems   Constitutional:  Negative for chills and fever.   Respiratory:  Negative for shortness of breath.    Gastrointestinal:  Negative for diarrhea, nausea and vomiting.   Endocrine: Negative for cold intolerance and heat intolerance.   Neurological:  Negative for dizziness and light-headedness.       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)    Diabetes mellitus (Multi)    Elevated transaminase level    BPH with elevated PSA    Enlarged prostate with lower urinary tract symptoms (LUTS)    Gout    Helicobacter pylori gastritis    Hyperlipidemia    Monoclonal (M) protein disease, multiple 'M' protein    Lipoma of neck    Morbidly obese (Multi)    Secondary renal hyperparathyroidism (Multi)    Vitamin D deficiency, unspecified    Mild nonproliferative diabetic retinopathy (Multi)    Follow-up exam        Home Meds:  Current Outpatient Medications   Medication Instructions    allopurinol (ZYLOPRIM) 200 mg, oral, Daily    amLODIPine (NORVASC) 10 mg, oral, Daily    ascorbic acid (Vitamin C) 100 mg tablet Vitamin C 100 MG Oral Tablet  Refills: 0    aspirin 81 mg, oral, Daily    brimonidine (AlphaGAN P) 0.2 % ophthalmic solution 1 drop, Right Eye, 2 times daily    cholecalciferol (Vitamin D3) 50 mcg (2,000 unit) capsule Vitamin D CAPS  Refills: 0    cyanocobalamin (VITAMIN B-12) 1,000 mcg, oral,  Daily    dorzolamide-timoloL (Cosopt) 22.3-6.8 mg/mL ophthalmic solution Dorzolamide HCl-Timolol Mal 22.3-6.8 MG/ML Ophthalmic Solution  Quantity: 20   Refills: 0  Start: 20-Mar-2018    folic acid 0.8 mg capsule 1 capsule, oral, Daily    glipiZIDE (GLUCOTROL) 5 mg, oral, Daily    iron polysaccharides (NU-IRON,NIFEREX) 150 mg, oral, Daily    Jardiance 10 mg, oral, Daily    lancets 33 gauge misc 3 times daily,  USE TO TEST BLOOD SUGAR THREE TIMES DAILY    latanoprost (Xalatan) 0.005 % ophthalmic solution Latanoprost 0.005 % Ophthalmic Solution  Quantity: 7   Refills: 0  Start: 13-Feb-2017    losartan (COZAAR) 100 mg, oral, Daily    metFORMIN (GLUCOPHAGE) 500 mg, oral, 2 times daily    multivit-min/folic/vit K/lycop (MEN'S MULTIVITAMIN ORAL) 1 tablet, oral, Daily    OneTouch Verio test strips strip USE AS DIRECTED TO TEST THREE TIMES DAILY    rosuvastatin (CRESTOR) 5 mg, oral, Daily    selenium 200 mcg tablet oral,  TAKE AS DIRECTED.    Trulicity 1.5 mg, subcutaneous, Once Weekly    zinc gluconate 50 mg tablet Zinc 50 MG Oral Tablet  Refills: 0        Allergies   Allergen Reactions    Lisinopril Angioedema and Swelling     Swelling of lips        Objective   Vitals:    06/24/24 0925   BP: 120/70   Pulse: 54   Resp: 16      Vitals:    06/24/24 0925   Weight: 95.3 kg (210 lb 3.2 oz)      Body mass index is 30.99 kg/m².   Physical Exam  Constitutional:       Appearance: Normal appearance. He is overweight.   HENT:      Head: Normocephalic and atraumatic.   Neck:      Thyroid: No thyroid mass, thyromegaly or thyroid tenderness.   Cardiovascular:      Rate and Rhythm: Normal rate and regular rhythm.      Heart sounds: No murmur heard.     No gallop.   Pulmonary:      Effort: Pulmonary effort is normal.      Breath sounds: Normal breath sounds.   Abdominal:      Palpations: Abdomen is soft.      Comments: benign   Neurological:      General: No focal deficit present.      Mental Status: He is alert and oriented to person,  place, and time.      Deep Tendon Reflexes: Reflexes are normal and symmetric.   Psychiatric:         Behavior: Behavior is cooperative.         Labs:  Lab Results   Component Value Date    HGBA1C 6.6 (H) 04/29/2024    TSH 1.64 09/03/2019      Lab Results   Component Value Date    PR1 SHAILA 09/05/2018        Assessment/Plan   Problem List Items Addressed This Visit       Benign essential hypertension    Diabetes mellitus (Multi) - Primary    Hyperlipidemia     Dm2:  Sugars are great, a little low in pm  Will cut back glipizide to 2.5 in am only  Htn:  Bp excellent  Lipids:  Excellent control  Schedule for eye exam in july    Electronically signed by:  Elyse Santana MD 06/24/24 9:58 AM

## 2024-06-28 DIAGNOSIS — I10 ESSENTIAL HYPERTENSION, BENIGN: ICD-10-CM

## 2024-06-28 RX ORDER — LOSARTAN POTASSIUM 100 MG/1
100 TABLET ORAL DAILY
Qty: 90 TABLET | Refills: 1 | Status: SHIPPED | OUTPATIENT
Start: 2024-06-28

## 2024-08-26 ENCOUNTER — TELEPHONE (OUTPATIENT)
Dept: HEMATOLOGY/ONCOLOGY | Facility: CLINIC | Age: 74
End: 2024-08-26
Payer: MEDICARE

## 2024-08-26 DIAGNOSIS — D47.2 MONOCLONAL (M) PROTEIN DISEASE, MULTIPLE 'M' PROTEIN: ICD-10-CM

## 2024-08-26 DIAGNOSIS — E53.8 VITAMIN B12 DEFICIENCY: ICD-10-CM

## 2024-08-26 DIAGNOSIS — D64.9 ANEMIA, UNSPECIFIED TYPE: ICD-10-CM

## 2024-08-26 DIAGNOSIS — K90.9 IDIOPATHIC STEATORRHEA (HHS-HCC): ICD-10-CM

## 2024-08-26 DIAGNOSIS — I10 HYPERTENSION, UNSPECIFIED TYPE: ICD-10-CM

## 2024-08-26 RX ORDER — IRON POLYSACCHARIDE COMPLEX 150 MG
150 CAPSULE ORAL DAILY
Qty: 90 CAPSULE | Refills: 3 | Status: SHIPPED | OUTPATIENT
Start: 2024-08-26

## 2024-08-26 RX ORDER — IRON POLYSACCHARIDE COMPLEX 150 MG
150 CAPSULE ORAL DAILY
Qty: 90 CAPSULE | Refills: 3 | Status: CANCELLED | OUTPATIENT
Start: 2024-08-26

## 2024-08-26 NOTE — TELEPHONE ENCOUNTER
Called pharmacy and pt's prescription was somehow updated for be for 30 day supply.  Pt requires refill.

## 2024-08-27 ENCOUNTER — TELEPHONE (OUTPATIENT)
Dept: PRIMARY CARE | Facility: CLINIC | Age: 74
End: 2024-08-27
Payer: MEDICARE

## 2024-08-27 RX ORDER — AMLODIPINE BESYLATE 10 MG/1
10 TABLET ORAL DAILY
Qty: 90 TABLET | Refills: 1 | Status: SHIPPED | OUTPATIENT
Start: 2024-08-27

## 2024-09-04 DIAGNOSIS — Z79.4 TYPE 2 DIABETES MELLITUS WITH OTHER SPECIFIED COMPLICATION, WITH LONG-TERM CURRENT USE OF INSULIN (MULTI): Primary | ICD-10-CM

## 2024-09-04 DIAGNOSIS — E11.69 TYPE 2 DIABETES MELLITUS WITH OTHER SPECIFIED COMPLICATION, WITH LONG-TERM CURRENT USE OF INSULIN (MULTI): Primary | ICD-10-CM

## 2024-09-09 DIAGNOSIS — E78.5 HYPERLIPIDEMIA, UNSPECIFIED HYPERLIPIDEMIA TYPE: ICD-10-CM

## 2024-09-09 DIAGNOSIS — E79.0 HYPERURICEMIA: ICD-10-CM

## 2024-09-10 RX ORDER — ROSUVASTATIN CALCIUM 5 MG/1
5 TABLET, COATED ORAL DAILY
Qty: 90 TABLET | Refills: 1 | Status: SHIPPED | OUTPATIENT
Start: 2024-09-10

## 2024-09-10 RX ORDER — ALLOPURINOL 100 MG/1
200 TABLET ORAL DAILY
Qty: 180 TABLET | Refills: 1 | Status: SHIPPED | OUTPATIENT
Start: 2024-09-10

## 2024-09-13 DIAGNOSIS — E11.9 TYPE 2 DIABETES MELLITUS WITHOUT COMPLICATION, WITHOUT LONG-TERM CURRENT USE OF INSULIN (MULTI): ICD-10-CM

## 2024-09-16 RX ORDER — BLOOD-GLUCOSE METER
EACH MISCELLANEOUS
Qty: 300 STRIP | Refills: 0 | Status: SHIPPED | OUTPATIENT
Start: 2024-09-16

## 2024-10-12 ENCOUNTER — LAB (OUTPATIENT)
Dept: LAB | Facility: LAB | Age: 74
End: 2024-10-12
Payer: MEDICARE

## 2024-10-12 DIAGNOSIS — I10 ESSENTIAL HYPERTENSION, BENIGN: ICD-10-CM

## 2024-10-12 DIAGNOSIS — E11.9 TYPE 2 DIABETES MELLITUS WITHOUT COMPLICATION, WITHOUT LONG-TERM CURRENT USE OF INSULIN (MULTI): ICD-10-CM

## 2024-10-12 DIAGNOSIS — E11.69 TYPE 2 DIABETES MELLITUS WITH OTHER SPECIFIED COMPLICATION, WITH LONG-TERM CURRENT USE OF INSULIN: ICD-10-CM

## 2024-10-12 DIAGNOSIS — Z79.4 TYPE 2 DIABETES MELLITUS WITH OTHER SPECIFIED COMPLICATION, WITH LONG-TERM CURRENT USE OF INSULIN: ICD-10-CM

## 2024-10-12 LAB
ALBUMIN SERPL BCP-MCNC: 4.3 G/DL (ref 3.4–5)
ALP SERPL-CCNC: 67 U/L (ref 33–136)
ALT SERPL W P-5'-P-CCNC: 12 U/L (ref 10–52)
ANION GAP SERPL CALC-SCNC: 12 MMOL/L (ref 10–20)
AST SERPL W P-5'-P-CCNC: 20 U/L (ref 9–39)
BILIRUB SERPL-MCNC: 0.4 MG/DL (ref 0–1.2)
BUN SERPL-MCNC: 24 MG/DL (ref 6–23)
CALCIUM SERPL-MCNC: 9.5 MG/DL (ref 8.6–10.6)
CHLORIDE SERPL-SCNC: 109 MMOL/L (ref 98–107)
CHOLEST SERPL-MCNC: 109 MG/DL (ref 0–199)
CHOLESTEROL/HDL RATIO: 2.8
CO2 SERPL-SCNC: 26 MMOL/L (ref 21–32)
CREAT SERPL-MCNC: 1.44 MG/DL (ref 0.5–1.3)
EGFRCR SERPLBLD CKD-EPI 2021: 51 ML/MIN/1.73M*2
EST. AVERAGE GLUCOSE BLD GHB EST-MCNC: 123 MG/DL
GLUCOSE SERPL-MCNC: 88 MG/DL (ref 74–99)
HBA1C MFR BLD: 5.9 %
HDLC SERPL-MCNC: 39 MG/DL
LDLC SERPL CALC-MCNC: 46 MG/DL
NON HDL CHOLESTEROL: 70 MG/DL (ref 0–149)
POTASSIUM SERPL-SCNC: 4.5 MMOL/L (ref 3.5–5.3)
PROT SERPL-MCNC: 7.3 G/DL (ref 6.4–8.2)
SODIUM SERPL-SCNC: 142 MMOL/L (ref 136–145)
TRIGL SERPL-MCNC: 122 MG/DL (ref 0–149)
VLDL: 24 MG/DL (ref 0–40)

## 2024-10-12 PROCEDURE — 80053 COMPREHEN METABOLIC PANEL: CPT

## 2024-10-12 PROCEDURE — 36415 COLL VENOUS BLD VENIPUNCTURE: CPT

## 2024-10-12 PROCEDURE — 80061 LIPID PANEL: CPT

## 2024-10-12 PROCEDURE — 83036 HEMOGLOBIN GLYCOSYLATED A1C: CPT

## 2024-10-12 RX ORDER — GLIPIZIDE 5 MG/1
5 TABLET ORAL DAILY
Qty: 90 TABLET | Refills: 2 | Status: SHIPPED | OUTPATIENT
Start: 2024-10-12

## 2024-10-12 RX ORDER — EMPAGLIFLOZIN 10 MG/1
10 TABLET, FILM COATED ORAL DAILY
Qty: 90 TABLET | Refills: 2 | Status: SHIPPED | OUTPATIENT
Start: 2024-10-12

## 2024-10-13 RX ORDER — LOSARTAN POTASSIUM 100 MG/1
100 TABLET ORAL DAILY
Qty: 90 TABLET | Refills: 1 | Status: SHIPPED | OUTPATIENT
Start: 2024-10-13 | End: 2024-10-14 | Stop reason: SDUPTHER

## 2024-10-13 RX ORDER — BLOOD-GLUCOSE METER
EACH MISCELLANEOUS
Qty: 300 STRIP | Refills: 0 | Status: SHIPPED | OUTPATIENT
Start: 2024-10-13

## 2024-10-14 RX ORDER — LOSARTAN POTASSIUM 100 MG/1
100 TABLET ORAL DAILY
Qty: 90 TABLET | Refills: 2 | Status: SHIPPED | OUTPATIENT
Start: 2024-10-14

## 2024-10-14 RX ORDER — AMLODIPINE BESYLATE 10 MG/1
10 TABLET ORAL DAILY
Qty: 90 TABLET | Refills: 2 | Status: SHIPPED | OUTPATIENT
Start: 2024-10-14

## 2024-10-14 RX ORDER — GLIPIZIDE 5 MG/1
5 TABLET ORAL DAILY
Qty: 90 TABLET | Refills: 2 | Status: SHIPPED | OUTPATIENT
Start: 2024-10-14 | End: 2024-10-15 | Stop reason: DRUGHIGH

## 2024-10-14 RX ORDER — METFORMIN HYDROCHLORIDE 500 MG/1
500 TABLET ORAL 2 TIMES DAILY
Qty: 180 TABLET | Refills: 2 | Status: SHIPPED | OUTPATIENT
Start: 2024-10-14 | End: 2024-10-15 | Stop reason: ENTERED-IN-ERROR

## 2024-10-14 RX ORDER — ROSUVASTATIN CALCIUM 5 MG/1
5 TABLET, COATED ORAL DAILY
Qty: 90 TABLET | Refills: 2 | Status: SHIPPED | OUTPATIENT
Start: 2024-10-14

## 2024-10-14 ASSESSMENT — ENCOUNTER SYMPTOMS
LOSS OF SENSATION IN FEET: 0
CONSTITUTIONAL NEGATIVE: 1
DEPRESSION: 0
OCCASIONAL FEELINGS OF UNSTEADINESS: 0

## 2024-10-14 ASSESSMENT — ACTIVITIES OF DAILY LIVING (ADL)
MANAGING_FINANCES: INDEPENDENT
DRESSING: INDEPENDENT
DOING_HOUSEWORK: INDEPENDENT
BATHING: INDEPENDENT
GROCERY_SHOPPING: INDEPENDENT
TAKING_MEDICATION: INDEPENDENT

## 2024-10-14 ASSESSMENT — PATIENT HEALTH QUESTIONNAIRE - PHQ9
2. FEELING DOWN, DEPRESSED OR HOPELESS: NOT AT ALL
1. LITTLE INTEREST OR PLEASURE IN DOING THINGS: NOT AT ALL
SUM OF ALL RESPONSES TO PHQ9 QUESTIONS 1 AND 2: 0

## 2024-10-14 NOTE — PROGRESS NOTES
"Dictation #1  MRN:[id number]  CSN:[id number] Patient ID: Ismael Haley is a 73 y.o. male who presents for Medicare Annual Wellness Visit Subsequent and Follow-up.    /69   Pulse 56   Ht 1.753 m (5' 9\")   Wt 92.5 kg (204 lb)   SpO2 97%   BMI 30.13 kg/m²     HPI      HTN ON MEDICATIONS CONTROLLED  HTN ESTABLISHED  NO CHEST PAIN , NO PALPITATION   NO SOB , NO PND, NO ORTHOPNEA  NO HEADACHE , NO LEG SWELLING   NO TURCIOS   CONTROLLED/UNCONTROLLED WITH MEDICATION   NO INTOLERANCE TO MEDICATION               DIABETES WITH CKD STAGE 3   NO LEG SWELLING            ADENOCARCINOMA PROSTATE   PT SEEING UROLOGY         MORBIDLY OBESE   NO APNEA , NO SNORING   NO GASPING OR CHOCKING FOR AIR AT NIGHT   NO DAY TIME SOMNOLENCE   NO AM FATIGUE               IgG MONOCLONAL GAMMOPATHY   STABLE , PT SEE ONCOLOGY              Subjective     Review of Systems   Constitutional: Negative.    All other systems reviewed and are negative.      Objective     Physical Exam  Vitals and nursing note reviewed.   Constitutional:       Appearance: He is obese.   Neck:      Vascular: No carotid bruit.   Cardiovascular:      Rate and Rhythm: Normal rate and regular rhythm.      Pulses: Normal pulses.      Heart sounds: Normal heart sounds. No murmur heard.  Pulmonary:      Effort: Pulmonary effort is normal.      Breath sounds: Normal breath sounds.   Abdominal:      Palpations: There is no mass.      Comments: OBESE   Skin:     Capillary Refill: Capillary refill takes more than 3 seconds.   Neurological:      General: No focal deficit present.      Mental Status: He is oriented to person, place, and time. Mental status is at baseline.   Psychiatric:         Mood and Affect: Mood normal.         Behavior: Behavior normal.         Thought Content: Thought content normal.         Judgment: Judgment normal.         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)     STABLE          Diabetes mellitus (Multi)    Relevant Medications    glipiZIDE (Glucotrol) 5 mg tablet    Hyperlipidemia    Relevant Medications    rosuvastatin (Crestor) 5 mg tablet    Monoclonal (M) protein disease, multiple 'M' protein    Morbidly obese (Multi)     ADVISED TO LOOSE WEIGHT   AND NEED TO REDUCE PORTION SIZE   AND TOTAL CALORIE INTAKE          Secondary renal hyperparathyroidism (Multi)     STABLE         Mild nonproliferative diabetic retinopathy (Multi)     Other Visit Diagnoses       Medicare annual wellness visit, subsequent  (Chronic)   -  Primary    Essential hypertension, benign        Relevant Medications    losartan (Cozaar) 100 mg tablet    Hypertension, unspecified type        Relevant Medications    amLODIPine (Norvasc) 10 mg tablet                 A/P       CMP,A1C , LIPID DONE  REF CARE MANAGER   NEY CUBA FOR GLUCOMETER   HIGH DOSE INFLUENZA VACCINE       Advance Care Planning Note     Discussion Date: 10/15/24   Discussion Participants: patient    The patient wishes to discuss Advance Care Planning today and the following is a brief summary of our discussion.     Patient has capacity to make their own medical decisions: Yes  Health Care Agent/Surrogate Decision Maker documented in chart: Yes    Documents on file and valid:  Advance Directive/Living Will: No   Health Care Power of : No  Other:     Communication of Medical Status/Prognosis:        Communication of Treatment Goals/Options:     Discussed with the patient end-of-life choices regarding healthcare power of /living will he does not have it he have not yet decided about how to do about it or go about it whenever it will be ready he will bring it on next office visit so that it can be scanned into the chart for  the purpose of documentation he is presently full code       Treatment Decisions  Goals of Care: survival is prioritized, if goals for quality or survival can reasonably be achieved     Follow Up Plan    Team Members    Time Statement: Total face to face time spent on advance care planning was 5 minutes with 5 minutes spent in counseling, including the explanation.    Zarina Ricks MD  10/15/2024 9:12 AM

## 2024-10-15 ENCOUNTER — APPOINTMENT (OUTPATIENT)
Dept: PRIMARY CARE | Facility: CLINIC | Age: 74
End: 2024-10-15
Payer: MEDICARE

## 2024-10-15 VITALS
DIASTOLIC BLOOD PRESSURE: 69 MMHG | WEIGHT: 204 LBS | SYSTOLIC BLOOD PRESSURE: 135 MMHG | HEART RATE: 56 BPM | BODY MASS INDEX: 30.21 KG/M2 | HEIGHT: 69 IN | OXYGEN SATURATION: 97 %

## 2024-10-15 DIAGNOSIS — I10 BENIGN ESSENTIAL HYPERTENSION: ICD-10-CM

## 2024-10-15 DIAGNOSIS — N18.30 CKD STAGE 3 DUE TO TYPE 2 DIABETES MELLITUS (MULTI): ICD-10-CM

## 2024-10-15 DIAGNOSIS — E11.22 CKD STAGE 3 DUE TO TYPE 2 DIABETES MELLITUS (MULTI): ICD-10-CM

## 2024-10-15 DIAGNOSIS — D47.2 MONOCLONAL (M) PROTEIN DISEASE, MULTIPLE 'M' PROTEIN: ICD-10-CM

## 2024-10-15 DIAGNOSIS — N25.81 SECONDARY RENAL HYPERPARATHYROIDISM (MULTI): ICD-10-CM

## 2024-10-15 DIAGNOSIS — E78.5 HYPERLIPIDEMIA, UNSPECIFIED HYPERLIPIDEMIA TYPE: ICD-10-CM

## 2024-10-15 DIAGNOSIS — Z00.00 MEDICARE ANNUAL WELLNESS VISIT, SUBSEQUENT: Primary | Chronic | ICD-10-CM

## 2024-10-15 DIAGNOSIS — E11.3299 MILD NONPROLIFERATIVE DIABETIC RETINOPATHY WITHOUT MACULAR EDEMA ASSOCIATED WITH TYPE 2 DIABETES MELLITUS, UNSPECIFIED LATERALITY: ICD-10-CM

## 2024-10-15 DIAGNOSIS — I10 ESSENTIAL HYPERTENSION, BENIGN: ICD-10-CM

## 2024-10-15 DIAGNOSIS — C61 ADENOCARCINOMA OF PROSTATE (MULTI): ICD-10-CM

## 2024-10-15 DIAGNOSIS — E66.01 MORBIDLY OBESE (MULTI): ICD-10-CM

## 2024-10-15 DIAGNOSIS — I10 HYPERTENSION, UNSPECIFIED TYPE: ICD-10-CM

## 2024-10-15 DIAGNOSIS — E11.49 TYPE 2 DIABETES MELLITUS WITH OTHER NEUROLOGIC COMPLICATION, WITHOUT LONG-TERM CURRENT USE OF INSULIN: ICD-10-CM

## 2024-10-15 DIAGNOSIS — E11.9 TYPE 2 DIABETES MELLITUS WITHOUT COMPLICATION, WITHOUT LONG-TERM CURRENT USE OF INSULIN (MULTI): ICD-10-CM

## 2024-10-15 PROCEDURE — 1160F RVW MEDS BY RX/DR IN RCRD: CPT | Performed by: INTERNAL MEDICINE

## 2024-10-15 PROCEDURE — 4010F ACE/ARB THERAPY RXD/TAKEN: CPT | Performed by: INTERNAL MEDICINE

## 2024-10-15 PROCEDURE — 1159F MED LIST DOCD IN RCRD: CPT | Performed by: INTERNAL MEDICINE

## 2024-10-15 PROCEDURE — G0439 PPPS, SUBSEQ VISIT: HCPCS | Performed by: INTERNAL MEDICINE

## 2024-10-15 PROCEDURE — 3075F SYST BP GE 130 - 139MM HG: CPT | Performed by: INTERNAL MEDICINE

## 2024-10-15 PROCEDURE — 3048F LDL-C <100 MG/DL: CPT | Performed by: INTERNAL MEDICINE

## 2024-10-15 PROCEDURE — 1170F FXNL STATUS ASSESSED: CPT | Performed by: INTERNAL MEDICINE

## 2024-10-15 PROCEDURE — 3078F DIAST BP <80 MM HG: CPT | Performed by: INTERNAL MEDICINE

## 2024-10-15 PROCEDURE — G0008 ADMIN INFLUENZA VIRUS VAC: HCPCS | Performed by: INTERNAL MEDICINE

## 2024-10-15 PROCEDURE — 90662 IIV NO PRSV INCREASED AG IM: CPT | Performed by: INTERNAL MEDICINE

## 2024-10-15 PROCEDURE — G2211 COMPLEX E/M VISIT ADD ON: HCPCS | Performed by: INTERNAL MEDICINE

## 2024-10-15 PROCEDURE — 1036F TOBACCO NON-USER: CPT | Performed by: INTERNAL MEDICINE

## 2024-10-15 PROCEDURE — 99214 OFFICE O/P EST MOD 30 MIN: CPT | Performed by: INTERNAL MEDICINE

## 2024-10-15 PROCEDURE — 3044F HG A1C LEVEL LT 7.0%: CPT | Performed by: INTERNAL MEDICINE

## 2024-10-15 RX ORDER — GLIPIZIDE 5 MG/1
TABLET ORAL
Qty: 90 TABLET | Refills: 2 | Status: SHIPPED | OUTPATIENT
Start: 2024-10-15

## 2024-10-15 ASSESSMENT — ACTIVITIES OF DAILY LIVING (ADL)
DOING_HOUSEWORK: INDEPENDENT
TAKING_MEDICATION: INDEPENDENT
BATHING: INDEPENDENT
DRESSING: INDEPENDENT
GROCERY_SHOPPING: INDEPENDENT
MANAGING_FINANCES: INDEPENDENT

## 2024-10-15 ASSESSMENT — PATIENT HEALTH QUESTIONNAIRE - PHQ9
1. LITTLE INTEREST OR PLEASURE IN DOING THINGS: NOT AT ALL
2. FEELING DOWN, DEPRESSED OR HOPELESS: NOT AT ALL
SUM OF ALL RESPONSES TO PHQ9 QUESTIONS 1 AND 2: 0

## 2024-10-23 ENCOUNTER — DOCUMENTATION (OUTPATIENT)
Dept: PRIMARY CARE | Facility: CLINIC | Age: 74
End: 2024-10-23
Payer: MEDICARE

## 2024-10-23 NOTE — PROGRESS NOTES
Called Mr. Haley to introduce chronic care management services, referral from Dr. Ricks. Unable to reach. Left voicemail. Flyer sent via [employer] and mail.

## 2024-10-24 ENCOUNTER — PATIENT OUTREACH (OUTPATIENT)
Dept: PRIMARY CARE | Facility: CLINIC | Age: 74
End: 2024-10-24
Payer: MEDICARE

## 2024-10-24 DIAGNOSIS — E11.49 TYPE 2 DIABETES MELLITUS WITH OTHER NEUROLOGIC COMPLICATION, WITHOUT LONG-TERM CURRENT USE OF INSULIN: ICD-10-CM

## 2024-10-24 DIAGNOSIS — I10 BENIGN ESSENTIAL HYPERTENSION: ICD-10-CM

## 2024-10-24 DIAGNOSIS — N18.30 CKD STAGE 3 DUE TO TYPE 2 DIABETES MELLITUS (MULTI): ICD-10-CM

## 2024-10-24 DIAGNOSIS — E11.22 CKD STAGE 3 DUE TO TYPE 2 DIABETES MELLITUS (MULTI): ICD-10-CM

## 2024-10-24 DIAGNOSIS — E78.5 HYPERLIPIDEMIA, UNSPECIFIED HYPERLIPIDEMIA TYPE: ICD-10-CM

## 2024-10-24 ASSESSMENT — ENCOUNTER SYMPTOMS
LOSS OF SENSATION IN FEET: 0
OCCASIONAL FEELINGS OF UNSTEADINESS: 0
DEPRESSION: 0

## 2024-10-24 NOTE — PROGRESS NOTES
Patient introduced to Chronic Care Management Services   Patient opted into services on 10/24/2024  Services will be performed under the direction of PCP:   Dr. Ricks  Patient just had PCP appointment 10/15/2024  I have discussed the nature and availability of the service with the patient, the patient's responsibility for potential cost sharing, only one practitioner furnishing services during a calendar month, and the right to stop services at any time.     Mr. Haley returned my call and was introduced to chronic care management services. He was also enrolled into chronic care management services, giving verbal consent to enroll. Referral by Dr. Ricks. General assessment and falls assessment completed. Medications were also reviewed along with upcoming appointments. Mr. Haley has my contact information for any additional questions or concerns.

## 2024-11-19 ENCOUNTER — DOCUMENTATION (OUTPATIENT)
Dept: PRIMARY CARE | Facility: CLINIC | Age: 74
End: 2024-11-19
Payer: MEDICARE

## 2024-11-19 ENCOUNTER — PATIENT OUTREACH (OUTPATIENT)
Dept: PRIMARY CARE | Facility: CLINIC | Age: 74
End: 2024-11-19
Payer: MEDICARE

## 2024-11-19 DIAGNOSIS — I10 BENIGN ESSENTIAL HYPERTENSION: ICD-10-CM

## 2024-11-19 DIAGNOSIS — E78.5 HYPERLIPIDEMIA, UNSPECIFIED HYPERLIPIDEMIA TYPE: ICD-10-CM

## 2024-11-19 DIAGNOSIS — E11.49 TYPE 2 DIABETES MELLITUS WITH OTHER NEUROLOGIC COMPLICATION, WITHOUT LONG-TERM CURRENT USE OF INSULIN: ICD-10-CM

## 2024-11-19 DIAGNOSIS — N18.30 CKD STAGE 3 DUE TO TYPE 2 DIABETES MELLITUS (MULTI): ICD-10-CM

## 2024-11-19 DIAGNOSIS — E11.22 CKD STAGE 3 DUE TO TYPE 2 DIABETES MELLITUS (MULTI): ICD-10-CM

## 2024-11-19 NOTE — PROGRESS NOTES
Chronic care mangement outreach complete. Mr. Haley returned call for [facility] outreach. He is doing well. Today's /74, today's blood sugar 110 this morning. Good appetite, normal bladder and bowel movement. Exercises 4-5x a week for 2 hours. [employer] and [employer] memberships. Complaint with medications. Medications reviewed, no need for refills. Up coming appointments reviewed. Mr. Haley has my contact information for any questions or concerns.

## 2024-12-02 ENCOUNTER — TELEPHONE (OUTPATIENT)
Dept: PRIMARY CARE | Facility: CLINIC | Age: 74
End: 2024-12-02
Payer: MEDICARE

## 2024-12-02 DIAGNOSIS — E11.49 TYPE 2 DIABETES MELLITUS WITH OTHER NEUROLOGIC COMPLICATION, WITHOUT LONG-TERM CURRENT USE OF INSULIN: Primary | ICD-10-CM

## 2024-12-03 ENCOUNTER — LAB (OUTPATIENT)
Dept: LAB | Facility: LAB | Age: 74
End: 2024-12-03
Payer: MEDICARE

## 2024-12-03 DIAGNOSIS — E11.49 TYPE 2 DIABETES MELLITUS WITH OTHER NEUROLOGIC COMPLICATION, WITHOUT LONG-TERM CURRENT USE OF INSULIN: ICD-10-CM

## 2024-12-03 LAB
ALBUMIN SERPL BCP-MCNC: 4 G/DL (ref 3.4–5)
ALP SERPL-CCNC: 65 U/L (ref 33–136)
ALT SERPL W P-5'-P-CCNC: 14 U/L (ref 10–52)
ANION GAP SERPL CALC-SCNC: 12 MMOL/L (ref 10–20)
AST SERPL W P-5'-P-CCNC: 20 U/L (ref 9–39)
BILIRUB SERPL-MCNC: 0.5 MG/DL (ref 0–1.2)
BUN SERPL-MCNC: 22 MG/DL (ref 6–23)
CALCIUM SERPL-MCNC: 8.9 MG/DL (ref 8.6–10.6)
CHLORIDE SERPL-SCNC: 107 MMOL/L (ref 98–107)
CO2 SERPL-SCNC: 28 MMOL/L (ref 21–32)
CREAT SERPL-MCNC: 1.55 MG/DL (ref 0.5–1.3)
EGFRCR SERPLBLD CKD-EPI 2021: 46 ML/MIN/1.73M*2
EST. AVERAGE GLUCOSE BLD GHB EST-MCNC: 131 MG/DL
GLUCOSE SERPL-MCNC: 82 MG/DL (ref 74–99)
HBA1C MFR BLD: 6.2 %
POTASSIUM SERPL-SCNC: 4.5 MMOL/L (ref 3.5–5.3)
PROT SERPL-MCNC: 7.1 G/DL (ref 6.4–8.2)
SODIUM SERPL-SCNC: 142 MMOL/L (ref 136–145)

## 2024-12-03 PROCEDURE — 80053 COMPREHEN METABOLIC PANEL: CPT

## 2024-12-03 PROCEDURE — 36415 COLL VENOUS BLD VENIPUNCTURE: CPT

## 2024-12-03 PROCEDURE — 83036 HEMOGLOBIN GLYCOSYLATED A1C: CPT

## 2024-12-19 ENCOUNTER — PATIENT OUTREACH (OUTPATIENT)
Dept: PRIMARY CARE | Facility: CLINIC | Age: 74
End: 2024-12-19
Payer: MEDICARE

## 2024-12-19 ENCOUNTER — DOCUMENTATION (OUTPATIENT)
Dept: PRIMARY CARE | Facility: CLINIC | Age: 74
End: 2024-12-19
Payer: MEDICARE

## 2024-12-19 DIAGNOSIS — I10 BENIGN ESSENTIAL HYPERTENSION: ICD-10-CM

## 2024-12-19 DIAGNOSIS — E11.49 TYPE 2 DIABETES MELLITUS WITH OTHER NEUROLOGIC COMPLICATION, WITHOUT LONG-TERM CURRENT USE OF INSULIN: ICD-10-CM

## 2024-12-19 NOTE — PROGRESS NOTES
Chronic care management outreach complete.  says that he is doing well. He was just leaving the gym during the time of this outreach. He said that his wellness nurse came to his house took his vitals along with reviewing all of his medications. BP this morning was 124/78 and his blood sugar was 112. Has a good appetite. No issues with bladder or bowel. Reviewed medications. No need for refills. Reviewed all up coming appointments. No questions or concerns at this time.

## 2024-12-23 ENCOUNTER — APPOINTMENT (OUTPATIENT)
Dept: ENDOCRINOLOGY | Facility: CLINIC | Age: 74
End: 2024-12-23
Payer: MEDICARE

## 2024-12-23 VITALS
WEIGHT: 207.2 LBS | DIASTOLIC BLOOD PRESSURE: 70 MMHG | HEART RATE: 78 BPM | RESPIRATION RATE: 16 BRPM | BODY MASS INDEX: 32.52 KG/M2 | HEIGHT: 67 IN | SYSTOLIC BLOOD PRESSURE: 120 MMHG

## 2024-12-23 DIAGNOSIS — E78.5 HYPERLIPIDEMIA, UNSPECIFIED HYPERLIPIDEMIA TYPE: ICD-10-CM

## 2024-12-23 DIAGNOSIS — I10 BENIGN ESSENTIAL HYPERTENSION: ICD-10-CM

## 2024-12-23 DIAGNOSIS — E11.9 TYPE 2 DIABETES MELLITUS WITHOUT COMPLICATION, WITHOUT LONG-TERM CURRENT USE OF INSULIN (MULTI): Primary | ICD-10-CM

## 2024-12-23 DIAGNOSIS — E11.9 TYPE 2 DIABETES MELLITUS WITHOUT COMPLICATION, WITHOUT LONG-TERM CURRENT USE OF INSULIN (MULTI): ICD-10-CM

## 2024-12-23 PROCEDURE — 1160F RVW MEDS BY RX/DR IN RCRD: CPT | Performed by: INTERNAL MEDICINE

## 2024-12-23 PROCEDURE — 3078F DIAST BP <80 MM HG: CPT | Performed by: INTERNAL MEDICINE

## 2024-12-23 PROCEDURE — 1159F MED LIST DOCD IN RCRD: CPT | Performed by: INTERNAL MEDICINE

## 2024-12-23 PROCEDURE — 1036F TOBACCO NON-USER: CPT | Performed by: INTERNAL MEDICINE

## 2024-12-23 PROCEDURE — G2211 COMPLEX E/M VISIT ADD ON: HCPCS | Performed by: INTERNAL MEDICINE

## 2024-12-23 PROCEDURE — 99214 OFFICE O/P EST MOD 30 MIN: CPT | Performed by: INTERNAL MEDICINE

## 2024-12-23 PROCEDURE — 3074F SYST BP LT 130 MM HG: CPT | Performed by: INTERNAL MEDICINE

## 2024-12-23 ASSESSMENT — ENCOUNTER SYMPTOMS
NAUSEA: 0
SHORTNESS OF BREATH: 0
DIZZINESS: 0
CHILLS: 0
FEVER: 0
DIARRHEA: 0
VOMITING: 0
LIGHT-HEADEDNESS: 0

## 2024-12-23 NOTE — ASSESSMENT & PLAN NOTE
Sugars are great, no changes today  Utd with eye exam  Orders:    CBC; Future    Comprehensive Metabolic Panel; Future    Lipid Panel; Future    Hemoglobin A1C; Future    Albumin-Creatinine Ratio, Urine Random; Future

## 2024-12-23 NOTE — PROGRESS NOTES
Endocrinology: Follow up visit  Subjective   Patient ID: Ismael Haley is a 74 y.o. male who presents for Diabetes (Type 2), Hyperlipidemia, and Hypertension.    PCP: Zarina Ricks MD    HPI  Dm2  Jardiance 10 mg   Trulicity 1.5  Glipizide 2.5 mg    Since last visit doing great with sugars  Last time reduced glipizide due to lows.  None since  A1c excellent at 6.2.   feels well.  No complaints    Review of Systems   Constitutional:  Negative for chills and fever.   Respiratory:  Negative for shortness of breath.    Gastrointestinal:  Negative for diarrhea, nausea and vomiting.   Endocrine: Negative for cold intolerance and heat intolerance.   Neurological:  Negative for dizziness and light-headedness.       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)    Diabetes mellitus (Multi)    Elevated transaminase level    BPH with elevated PSA    Enlarged prostate with lower urinary tract symptoms (LUTS)    Gout    Helicobacter pylori gastritis    Hyperlipidemia    Monoclonal (M) protein disease, multiple 'M' protein    Lipoma of neck    Morbidly obese (Multi)    Secondary renal hyperparathyroidism (Multi)    Vitamin D deficiency, unspecified    Mild nonproliferative diabetic retinopathy (Multi)    Follow-up exam        Home Meds:  Current Outpatient Medications   Medication Instructions    allopurinol (ZYLOPRIM) 200 mg, oral, Daily    amLODIPine (NORVASC) 10 mg, oral, Daily    ascorbic acid (Vitamin C) 100 mg tablet Vitamin C 100 MG Oral Tablet  Refills: 0    aspirin 81 mg, Daily    brimonidine (AlphaGAN P) 0.2 % ophthalmic solution 1 drop, 2 times daily    cholecalciferol (Vitamin D3) 50 mcg (2,000 unit) capsule Vitamin D CAPS  Refills: 0    cyanocobalamin (VITAMIN B-12) 1,000 mcg, oral, Daily    dorzolamide-timoloL  (Cosopt) 22.3-6.8 mg/mL ophthalmic solution Dorzolamide HCl-Timolol Mal 22.3-6.8 MG/ML Ophthalmic Solution  Quantity: 20   Refills: 0  Start: 20-Mar-2018    folic acid 0.8 mg capsule 1 capsule, Daily    glipiZIDE (Glucotrol) 5 mg tablet PT TAKING 2.5MG A DAY    iron polysaccharides (NU-IRON,NIFEREX) 150 mg, oral, Daily    Jardiance 10 mg, oral, Daily    lancets 33 gauge misc 3 times daily    latanoprost (Xalatan) 0.005 % ophthalmic solution Latanoprost 0.005 % Ophthalmic Solution  Quantity: 7   Refills: 0  Start: 13-Feb-2017    losartan (COZAAR) 100 mg, oral, Daily    multivit-min/folic/vit K/lycop (MEN'S MULTIVITAMIN ORAL) 1 tablet, Daily    OneTouch Verio test strips strip USE AS DIRECTED TO TEST THREE TIMES DAILY    rosuvastatin (CRESTOR) 5 mg, oral, Daily    selenium 200 mcg tablet Take by mouth.  TAKE AS DIRECTED.    Trulicity 1.5 mg, subcutaneous, Once Weekly    zinc gluconate 50 mg tablet Zinc 50 MG Oral Tablet  Refills: 0        Allergies   Allergen Reactions    Lisinopril Angioedema and Swelling     Swelling of lips        Objective   Vitals:    12/23/24 0913   BP: 120/70   Pulse: 78   Resp: 16      Vitals:    12/23/24 0913   Weight: 94 kg (207 lb 3.2 oz)      Body mass index is 32.45 kg/m².   Physical Exam  Constitutional:       Appearance: Normal appearance. He is overweight.   HENT:      Head: Normocephalic and atraumatic.   Neck:      Thyroid: No thyroid mass, thyromegaly or thyroid tenderness.   Cardiovascular:      Rate and Rhythm: Normal rate and regular rhythm.      Heart sounds: No murmur heard.     No gallop.   Pulmonary:      Effort: Pulmonary effort is normal.      Breath sounds: Normal breath sounds.   Abdominal:      Palpations: Abdomen is soft.      Comments: benign   Neurological:      General: No focal deficit present.      Mental Status: He is alert and oriented to person, place, and time.      Deep Tendon Reflexes: Reflexes are normal and symmetric.   Psychiatric:         Behavior:  Behavior is cooperative.         Labs:  Lab Results   Component Value Date    HGBA1C 6.2 (H) 12/03/2024    TSH 1.64 09/03/2019      Lab Results   Component Value Date    PR1 SHAILA 09/05/2018        Assessment/Plan   Assessment & Plan  Type 2 diabetes mellitus without complication, without long-term current use of insulin (Multi)  Sugars are great, no changes today  Utd with eye exam  Orders:    CBC; Future    Comprehensive Metabolic Panel; Future    Lipid Panel; Future    Hemoglobin A1C; Future    Albumin-Creatinine Ratio, Urine Random; Future    Benign essential hypertension    Bp excellent  Hyperlipidemia, unspecified hyperlipidemia type    Stable on statin  Lipids due next time      Electronically signed by:  Elyse Santana MD 12/23/24 9:15 AM

## 2024-12-24 DIAGNOSIS — E11.9 TYPE 2 DIABETES MELLITUS WITHOUT COMPLICATION, WITHOUT LONG-TERM CURRENT USE OF INSULIN (MULTI): ICD-10-CM

## 2024-12-24 RX ORDER — BLOOD-GLUCOSE METER
EACH MISCELLANEOUS
Qty: 300 STRIP | Refills: 0 | Status: SHIPPED | OUTPATIENT
Start: 2024-12-24

## 2025-01-01 DIAGNOSIS — E11.9 TYPE 2 DIABETES MELLITUS WITHOUT COMPLICATION, WITHOUT LONG-TERM CURRENT USE OF INSULIN (MULTI): ICD-10-CM

## 2025-01-02 RX ORDER — BLOOD-GLUCOSE METER
EACH MISCELLANEOUS
Qty: 300 STRIP | Refills: 0 | Status: SHIPPED | OUTPATIENT
Start: 2025-01-02

## 2025-01-16 ENCOUNTER — TELEPHONE (OUTPATIENT)
Dept: HEMATOLOGY/ONCOLOGY | Facility: CLINIC | Age: 75
End: 2025-01-16

## 2025-01-16 DIAGNOSIS — K90.9 IDIOPATHIC STEATORRHEA (HHS-HCC): ICD-10-CM

## 2025-01-16 DIAGNOSIS — D47.2 MONOCLONAL (M) PROTEIN DISEASE, MULTIPLE 'M' PROTEIN: ICD-10-CM

## 2025-01-16 DIAGNOSIS — D64.9 ANEMIA, UNSPECIFIED TYPE: ICD-10-CM

## 2025-01-16 DIAGNOSIS — E53.8 VITAMIN B12 DEFICIENCY: ICD-10-CM

## 2025-01-16 RX ORDER — LANOLIN ALCOHOL/MO/W.PET/CERES
1000 CREAM (GRAM) TOPICAL DAILY
Qty: 90 TABLET | Refills: 3 | Status: SHIPPED | OUTPATIENT
Start: 2025-01-16

## 2025-01-29 ENCOUNTER — PATIENT OUTREACH (OUTPATIENT)
Dept: PRIMARY CARE | Facility: CLINIC | Age: 75
End: 2025-01-29
Payer: MEDICARE

## 2025-01-29 ENCOUNTER — DOCUMENTATION (OUTPATIENT)
Dept: PRIMARY CARE | Facility: CLINIC | Age: 75
End: 2025-01-29
Payer: MEDICARE

## 2025-01-29 DIAGNOSIS — I10 BENIGN ESSENTIAL HYPERTENSION: ICD-10-CM

## 2025-01-29 DIAGNOSIS — E11.49 TYPE 2 DIABETES MELLITUS WITH OTHER NEUROLOGIC COMPLICATION, WITHOUT LONG-TERM CURRENT USE OF INSULIN: ICD-10-CM

## 2025-01-29 NOTE — PROGRESS NOTES
Chronic care management outreach complete. Ms. Haley returned call for his ccm monthly outreach. He reports that he is doing well.   Reviewed upcoming appointments. Hem/Onc fuv tomorrow.   Compliant with medications. No need for refills.  Monitors blood sugars and bp daily. Blood sugar this morning 105. BP this morning 122/74.   Continues to go to the [facility] for exercise.  No questions or concerns at this time.

## 2025-01-30 ENCOUNTER — OFFICE VISIT (OUTPATIENT)
Dept: HEMATOLOGY/ONCOLOGY | Facility: CLINIC | Age: 75
End: 2025-01-30
Payer: MEDICARE

## 2025-01-30 VITALS
RESPIRATION RATE: 16 BRPM | DIASTOLIC BLOOD PRESSURE: 88 MMHG | WEIGHT: 210.54 LBS | OXYGEN SATURATION: 98 % | SYSTOLIC BLOOD PRESSURE: 134 MMHG | HEIGHT: 69 IN | HEART RATE: 64 BPM | TEMPERATURE: 97.5 F | BODY MASS INDEX: 31.18 KG/M2

## 2025-01-30 DIAGNOSIS — D47.2 MONOCLONAL (M) PROTEIN DISEASE, MULTIPLE 'M' PROTEIN: ICD-10-CM

## 2025-01-30 DIAGNOSIS — D64.9 ANEMIA, UNSPECIFIED TYPE: ICD-10-CM

## 2025-01-30 DIAGNOSIS — K90.9 IDIOPATHIC STEATORRHEA (HHS-HCC): ICD-10-CM

## 2025-01-30 DIAGNOSIS — E53.8 VITAMIN B12 DEFICIENCY: ICD-10-CM

## 2025-01-30 LAB
ALBUMIN SERPL ELPH-MCNC: NORMAL G/DL
ALBUMIN SERPL-MCNC: 3.9 G/DL (ref 3.6–5.1)
ALBUMIN/GLOB SERPL: 1.4 (CALC) (ref 1–2.5)
ALP SERPL-CCNC: 62 U/L (ref 35–144)
ALPHA1 GLOB SERPL ELPH-MCNC: NORMAL G/DL
ALPHA2 GLOB SERPL ELPH-MCNC: NORMAL G/DL
ALT SERPL-CCNC: 11 U/L (ref 9–46)
AST SERPL-CCNC: 18 U/L (ref 10–35)
BASOPHILS # BLD AUTO: 19 CELLS/UL (ref 0–200)
BASOPHILS NFR BLD AUTO: 0.5 %
BETA1 GLOB SERPL ELPH-MCNC: NORMAL G/DL
BETA2 GLOB SERPL ELPH-MCNC: NORMAL G/DL
BILIRUB SERPL-MCNC: 0.4 MG/DL (ref 0.2–1.2)
BLASTS # BLD: ABNORMAL CELLS/UL
BLASTS NFR BLD MANUAL: ABNORMAL %
BUN SERPL-MCNC: 15 MG/DL (ref 7–25)
BUN/CREAT SERPL: 12 (CALC) (ref 6–22)
CALCIUM SERPL-MCNC: 9 MG/DL (ref 8.6–10.3)
CHLORIDE SERPL-SCNC: 110 MMOL/L (ref 98–110)
CO2 SERPL-SCNC: 24 MMOL/L (ref 20–32)
CREAT SERPL-MCNC: 1.3 MG/DL (ref 0.7–1.28)
EGFRCR SERPLBLD CKD-EPI 2021: 57 ML/MIN/1.73M2
EOSINOPHIL # BLD AUTO: 141 CELLS/UL (ref 15–500)
EOSINOPHIL NFR BLD AUTO: 3.7 %
ERYTHROCYTE [DISTWIDTH] IN BLOOD BY AUTOMATED COUNT: 15.8 % (ref 11–15)
FERRITIN SERPL-MCNC: 126 NG/ML (ref 24–380)
GAMMA GLOB SERPL ELPH-MCNC: NORMAL G/DL
GLOBULIN SER CALC-MCNC: 2.7 G/DL (CALC) (ref 1.9–3.7)
GLUCOSE SERPL-MCNC: 98 MG/DL (ref 65–99)
HCT VFR BLD AUTO: 48.7 % (ref 38.5–50)
HGB BLD-MCNC: 15.6 G/DL (ref 13.2–17.1)
IGA SERPL-MCNC: NORMAL MG/DL
IGG SERPL-MCNC: NORMAL MG/DL
IGM SERPL-MCNC: NORMAL MG/DL
INTERPRETATION SERPL IFE-IMP: NORMAL
IRON SATN MFR SERPL: 28 % (CALC) (ref 20–48)
IRON SERPL-MCNC: 78 MCG/DL (ref 50–180)
KAPPA LC SERPL-MCNC: NORMAL MG/DL
KAPPA LC/LAMBDA SER: NORMAL {RATIO}
LAMBDA LC SERPL-MCNC: NORMAL MG/DL
LYMPHOCYTES # BLD AUTO: 1493 CELLS/UL (ref 850–3900)
LYMPHOCYTES NFR BLD AUTO: 39.3 %
M PROTEIN 1 SERPL ELPH-MCNC: NORMAL G/L
M PROTEIN 2 SERPL ELPH-MCNC: NORMAL G/DL
M PROTEIN 3 SERPL ELPH-MCNC: NORMAL G/L
MCH RBC QN AUTO: 27 PG (ref 27–33)
MCHC RBC AUTO-ENTMCNC: 32 G/DL (ref 32–36)
MCV RBC AUTO: 84.4 FL (ref 80–100)
METAMYELOCYTES # BLD: ABNORMAL CELLS/UL
METAMYELOCYTES NFR BLD MANUAL: ABNORMAL %
MONOCYTES # BLD AUTO: 361 CELLS/UL (ref 200–950)
MONOCYTES NFR BLD AUTO: 9.5 %
MYELOCYTES # BLD: ABNORMAL CELLS/UL
MYELOCYTES NFR BLD MANUAL: ABNORMAL %
NEUTROPHILS # BLD AUTO: 1786 CELLS/UL (ref 1500–7800)
NEUTROPHILS NFR BLD AUTO: 47 %
NEUTS BAND # BLD: ABNORMAL CELLS/UL (ref 0–750)
NEUTS BAND NFR BLD MANUAL: ABNORMAL %
NRBC # BLD: ABNORMAL CELLS/UL
NRBC BLD-RTO: ABNORMAL /100 WBC
PLATELET # BLD AUTO: 183 THOUSAND/UL (ref 140–400)
PMV BLD REES-ECKER: 10.5 FL (ref 7.5–12.5)
POTASSIUM SERPL-SCNC: 4.8 MMOL/L (ref 3.5–5.3)
PROMYELOCYTES # BLD: ABNORMAL CELLS/UL
PROMYELOCYTES NFR BLD MANUAL: ABNORMAL %
PROT PATTERN SERPL ELPH-IMP: NORMAL
PROT SERPL-MCNC: 6.6 G/DL (ref 6.1–8.1)
PROT SERPL-MCNC: 6.6 G/DL (ref 6.1–8.1)
RBC # BLD AUTO: 5.77 MILLION/UL (ref 4.2–5.8)
SERVICE CMNT-IMP: ABNORMAL
SODIUM SERPL-SCNC: 143 MMOL/L (ref 135–146)
TIBC SERPL-MCNC: 276 MCG/DL (CALC) (ref 250–425)
VARIANT LYMPHS NFR BLD: ABNORMAL % (ref 0–10)
VIT B12 SERPL-MCNC: 1739 PG/ML (ref 200–1100)
WBC # BLD AUTO: 3.8 THOUSAND/UL (ref 3.8–10.8)

## 2025-01-30 PROCEDURE — 99214 OFFICE O/P EST MOD 30 MIN: CPT | Performed by: PHYSICIAN ASSISTANT

## 2025-01-30 PROCEDURE — 1126F AMNT PAIN NOTED NONE PRSNT: CPT | Performed by: PHYSICIAN ASSISTANT

## 2025-01-30 PROCEDURE — 3075F SYST BP GE 130 - 139MM HG: CPT | Performed by: PHYSICIAN ASSISTANT

## 2025-01-30 PROCEDURE — 3079F DIAST BP 80-89 MM HG: CPT | Performed by: PHYSICIAN ASSISTANT

## 2025-01-30 ASSESSMENT — PAIN SCALES - GENERAL: PAINLEVEL_OUTOF10: 0-NO PAIN

## 2025-01-30 NOTE — PROGRESS NOTES
Patient Visit Information:   Visit Type: Follow Up Visit     Cancer History:    Prostate   AJCC Edition: 8th (AJCC), Diagnosis Date: 13-Aug-2018, Stage (iPSA 28, Gl 4+3), T2c NX M0     Treatment Synopsis:    69-year-old -American male was referred by Dr. Ricks for evaluation of a mild normocytic anemia and IgA monoclonal protein per SPEP    In February and May 2019. CBC showed hemoglobin between 12.0-12.3 with a normal MCV at 84. Patient has no other cytopenias. SPEP showed IgA lamda at 0.1g/dl    he was dxed with prostate cancer in 2018 s/p radiation tx last dose in 2019. received lupron before the radiation tx and continued for planned 2 year. PSA was <0.1 in 2019.    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    he was started on folic acid, vit b12, and oral iron.    PMH: prostate cancer, DM, CKD, vit D deficiency, HTN    PSH: elbow surgery, finger surgery  FH: brother had colon cancer in his 70's.  Soc:   x 40yrs with 3 children and 7 GC.  Podiatrist, retired in .  Spends time at the [employer], exercises regularly.  Never Tob, rare ETOH.  grew up in [facility].  Fam Hx:  M-  of pna at 48yo.  F-  of MI at 65. 3 brothers and 1 sis  between ages 40-70; non cancer related.   (1)      History of Present Illness:   Patient presents for follow up. Overall doing well. In good spirits w/no complaints. Denies any urinary symptoms. Energy and appetite good. No longer taking B12 and iron supplements. Denies fever, chills, night sweats, unintentional weight loss, bleeding, or any other complaints at this time.     Review of Systems:    All of the systems have been reviewed and are negative for complaints except what is stated in the HPI and/or past medical history    Allergies and Intolerances:  Allergies   Allergen Reactions    Lisinopril Angioedema and Swelling     Swelling of lips   There were no vitals filed for this visit.    Outpatient medicatios:  Current Outpatient  Medications on File Prior to Visit   Medication Sig Dispense Refill    allopurinol (Zyloprim) 100 mg tablet Take 2 tablets (200 mg) by mouth once daily. 180 tablet 1    amLODIPine (Norvasc) 10 mg tablet Take 1 tablet (10 mg) by mouth once daily. 90 tablet 2    ascorbic acid (Vitamin C) 100 mg tablet Vitamin C 100 MG Oral Tablet  Refills: 0      aspirin 81 mg chewable tablet Chew 1 tablet (81 mg) once daily.      brimonidine (AlphaGAN P) 0.2 % ophthalmic solution Administer 1 drop into the right eye 2 times a day.      cholecalciferol (Vitamin D3) 50 mcg (2,000 unit) capsule Vitamin D CAPS  Refills: 0      cyanocobalamin (Vitamin B-12) 1,000 mcg tablet Take 1 tablet (1,000 mcg) by mouth once daily. 90 tablet 3    dorzolamide-timoloL (Cosopt) 22.3-6.8 mg/mL ophthalmic solution Dorzolamide HCl-Timolol Mal 22.3-6.8 MG/ML Ophthalmic Solution  Quantity: 20   Refills: 0  Start: 20-Mar-2018      dulaglutide (Trulicity) 1.5 mg/0.5 mL pen injector injection Inject 1.5 mg under the skin 1 (one) time per week. 6 mL 3    empagliflozin (Jardiance) 10 mg Take 1 tablet (10 mg) by mouth once daily. 90 tablet 2    folic acid 0.8 mg capsule Take 1 capsule by mouth once daily.      glipiZIDE (Glucotrol) 5 mg tablet PT TAKING 2.5MG A DAY 90 tablet 2    iron polysaccharides (Nu-Iron,Niferex) 150 mg iron capsule Take 1 capsule (150 mg) by mouth once daily. 90 capsule 3    lancets 33 gauge misc 3 times a day.  USE TO TEST BLOOD SUGAR THREE TIMES DAILY      latanoprost (Xalatan) 0.005 % ophthalmic solution Latanoprost 0.005 % Ophthalmic Solution  Quantity: 7   Refills: 0  Start: 13-Feb-2017      losartan (Cozaar) 100 mg tablet Take 1 tablet (100 mg) by mouth once daily. 90 tablet 2    multivit-min/folic/vit K/lycop (MEN'S MULTIVITAMIN ORAL) Take 1 tablet by mouth once daily.      OneTouch Verio test strips strip USE AS DIRECTED TO TEST THREE TIMES DAILY 300 strip 0    rosuvastatin (Crestor) 5 mg tablet Take 1 tablet (5 mg) by mouth once  "daily. 90 tablet 2    selenium 200 mcg tablet Take by mouth.  TAKE AS DIRECTED. (Patient not taking: Reported on 12/23/2024)      zinc gluconate 50 mg tablet Zinc 50 MG Oral Tablet  Refills: 0       No current facility-administered medications on file prior to visit.     Physical Exam:   Constitutional: alert, awake and oriented, not in acute distress   HEENT: moist mucous membranes, normal nose   Neck: supple, no lymphadenopathy   EYES: PERRL, EOM intact, conjunctiva normal  Skin: no jaundice, rash or erythema  Neurological: AAOx3, no gross focal deficit   Psychiatric: normal mood and behavior     Vitals:      10/10/2023    12:02 PM 10/24/2023     9:11 AM 1/30/2024     9:11 AM 6/24/2024     9:25 AM 10/15/2024     8:54 AM 12/23/2024     9:13 AM 1/30/2025     8:30 AM   Vitals   Systolic 124 127 137 120 135 120 134   Diastolic 64 75 87 70 69 70 88   BP Location  Left arm Right arm    Left arm   Heart Rate 57 52 67 54 56 78 64   Temp   36.7 °C (98.1 °F)    36.4 °C (97.5 °F)   Resp 14  16 16  16 16   Height 1.753 m (5' 9\") 1.778 m (5' 10\") 1.754 m (5' 9.06\") 1.754 m (5' 9.06\") 1.753 m (5' 9\") 1.702 m (5' 7\") 1.744 m (5' 8.66\")    Weight (lb) 212.8 210 212.96 210.2 204 207.2 210.54   BMI 31.43 kg/m2 30.13 kg/m2 31.4 kg/m2 30.99 kg/m2 30.13 kg/m2 32.45 kg/m2 31.4 kg/m2   BSA (m2) 2.17 m2 2.17 m2 2.17 m2 2.15 m2 2.12 m2 2.11 m2 2.15 m2   Visit Report Report Report Report Report Report Report Report       Significant value     Labs:  Lab Results   Component Value Date    WBC 3.5 (L) 01/29/2024    NEUTROABS 1.30 (L) 01/29/2024    IGABSOL 0.01 01/29/2024    LYMPHSABS 1.61 01/29/2024    MONOSABS 0.38 01/29/2024    EOSABS 0.20 01/29/2024    BASOSABS 0.03 01/29/2024    RBC 5.71 01/29/2024    MCV 86 01/29/2024    MCHC 31.4 (L) 01/29/2024    HGB 15.4 01/29/2024    HCT 49.1 01/29/2024     01/29/2024     Lab Results   Component Value Date    RETICCTPCT 1.4 09/03/2019      Lab Results   Component Value Date    CREATININE 1.55 " "(H) 12/03/2024    BUN 22 12/03/2024    EGFR 46 (L) 12/03/2024     12/03/2024    K 4.5 12/03/2024     12/03/2024    CO2 28 12/03/2024      Lab Results   Component Value Date    ALT 14 12/03/2024    AST 20 12/03/2024    ALKPHOS 65 12/03/2024    BILITOT 0.5 12/03/2024      Lab Results   Component Value Date    TSH 1.64 09/03/2019     Lab Results   Component Value Date    TSH 1.64 09/03/2019     Lab Results   Component Value Date    IRON 59 01/29/2024    TIBC 326 01/29/2024    FERRITIN 136 01/29/2024      Lab Results   Component Value Date    SCFJKTXP97 386 01/29/2024      Lab Results   Component Value Date    FOLATE >24.0 01/14/2023     Lab Results   Component Value Date    SEDRATE 11 09/03/2019      Lab Results   Component Value Date    CRP 0.22 09/03/2019      No results found for: \"JESÚS\"  Lab Results   Component Value Date     09/03/2019     Lab Results   Component Value Date    HAPTOGLOBIN 89 09/03/2019     Lab Results   Component Value Date    SPEP Aberrant band detected. See immunofixation. 01/29/2024     Lab Results   Component Value Date    IGG 1,150 01/29/2024    IGM 49 01/29/2024     (H) 01/29/2024     Assessment:    69 -year-old -American male presented with mild normocytic anemia and IgA lambda monoclonal protein per SPEP, which likely secondary to monoclonal gammopathy of undetermined significance.     anemia multifactorial could be related to recent radiation for prostate cancer and/or borderline vitamin B12, folate and iron deficiency    plasma cell dyscrasia, stable m protein    anemia likely secondary to TASIA and bone marrow supression from prior radiation as well as ADT    borderline folic acid and b12 deficiency     chronic renal insuf, stable    prostate cancer  Plan:    Reviewed and discussed lab results with patient in detail as well as diagnosis, prognosis, and treatment options.    Labs done at [facility] on 1/29/2025. Labs pending.    Continue to monitor MGUS q 6 " months     no need for immediate intervention at this time.    Continue oral B12 3x/week, folic acid, and oral iron supplementation.     f/u with urology    f/u w/PCP    Patient verbalized understanding, and all his questions were answered to his satisfaction    RTC in 12 months with labs or sooner as needed

## 2025-02-24 ENCOUNTER — DOCUMENTATION (OUTPATIENT)
Dept: PRIMARY CARE | Facility: CLINIC | Age: 75
End: 2025-02-24
Payer: MEDICARE

## 2025-02-24 NOTE — PROGRESS NOTES
Called [facility]w [facility] for ccm outreach. Unable to reach. Left voicemail to return call. Chart reviewed.

## 2025-02-28 ENCOUNTER — PATIENT OUTREACH (OUTPATIENT)
Dept: PRIMARY CARE | Facility: CLINIC | Age: 75
End: 2025-02-28
Payer: MEDICARE

## 2025-02-28 DIAGNOSIS — E11.49 TYPE 2 DIABETES MELLITUS WITH OTHER NEUROLOGIC COMPLICATION, WITHOUT LONG-TERM CURRENT USE OF INSULIN: ICD-10-CM

## 2025-02-28 DIAGNOSIS — I10 BENIGN ESSENTIAL HYPERTENSION: ICD-10-CM

## 2025-02-28 NOTE — PROGRESS NOTES
Chronic care management outreach complete. Mr. Haley says that he is doing well.   He continues to stay active and goes to the gym 4-5x a week.   He is not having any pain. No health concerns.   He has not taken his bp yet for today, but yesterday's bp was 123/76.  His blood sugar this morning was 105. No symptoms of hyperglycemia or hypoglycemia.  Reviewed medications. No need for refills.   Reviewed up coming appointment.   No questions or concerns this outreach.

## 2025-03-28 ENCOUNTER — DOCUMENTATION (OUTPATIENT)
Dept: PRIMARY CARE | Facility: CLINIC | Age: 75
End: 2025-03-28
Payer: MEDICARE

## 2025-04-09 ENCOUNTER — DOCUMENTATION (OUTPATIENT)
Dept: PRIMARY CARE | Facility: CLINIC | Age: 75
End: 2025-04-09
Payer: MEDICARE

## 2025-04-10 ENCOUNTER — PATIENT OUTREACH (OUTPATIENT)
Dept: PRIMARY CARE | Facility: CLINIC | Age: 75
End: 2025-04-10
Payer: MEDICARE

## 2025-04-10 DIAGNOSIS — E11.49 TYPE 2 DIABETES MELLITUS WITH OTHER NEUROLOGIC COMPLICATION, WITHOUT LONG-TERM CURRENT USE OF INSULIN: ICD-10-CM

## 2025-04-10 DIAGNOSIS — I10 BENIGN ESSENTIAL HYPERTENSION: ICD-10-CM

## 2025-04-10 NOTE — PROGRESS NOTES
Chronic care management outreach complete. Mr. Haley says that he is doing well.   He reports that he went to the eye doctor last week to follow up on his glaucoma. He will continue using his eye drops ans there is no changes in his prescription for his eye glasses.   He has a good appetite. No issues with urination or having bowel movements. No pain. No falls. No shortness of breath.  He continues to work out at the [facility] 4-5 times a week.   His blood pressure as 128/78.  Blood sugar was 108 this morning.   Medications reviewed. Complaint with medications. No need for refills.  Up coming appointments reviewed. PCP appointment 4/22.  No questions or concerns at this time.

## 2025-04-18 ENCOUNTER — TELEPHONE (OUTPATIENT)
Dept: PRIMARY CARE | Facility: CLINIC | Age: 75
End: 2025-04-18
Payer: MEDICARE

## 2025-04-18 DIAGNOSIS — Z79.4 TYPE 2 DIABETES MELLITUS WITH OTHER SPECIFIED COMPLICATION, WITH LONG-TERM CURRENT USE OF INSULIN: Primary | ICD-10-CM

## 2025-04-18 DIAGNOSIS — Z12.5 SCREENING FOR PROSTATE CANCER: ICD-10-CM

## 2025-04-18 DIAGNOSIS — E11.69 TYPE 2 DIABETES MELLITUS WITH OTHER SPECIFIED COMPLICATION, WITH LONG-TERM CURRENT USE OF INSULIN: Primary | ICD-10-CM

## 2025-04-20 LAB
ALBUMIN SERPL-MCNC: 4.1 G/DL (ref 3.6–5.1)
ALBUMIN/CREAT UR: NORMAL
ALP SERPL-CCNC: 62 U/L (ref 35–144)
ALT SERPL-CCNC: 13 U/L (ref 9–46)
ANION GAP SERPL CALCULATED.4IONS-SCNC: 12 MMOL/L (CALC) (ref 7–17)
AST SERPL-CCNC: 21 U/L (ref 10–35)
BILIRUB SERPL-MCNC: 0.4 MG/DL (ref 0.2–1.2)
BUN SERPL-MCNC: 27 MG/DL (ref 7–25)
CALCIUM SERPL-MCNC: 9.1 MG/DL (ref 8.6–10.3)
CHLORIDE SERPL-SCNC: 108 MMOL/L (ref 98–110)
CHOLEST SERPL-MCNC: 113 MG/DL
CHOLEST/HDLC SERPL: 2.9 (CALC)
CO2 SERPL-SCNC: 24 MMOL/L (ref 20–32)
CREAT SERPL-MCNC: 1.45 MG/DL (ref 0.7–1.28)
CREAT UR-MCNC: NORMAL MG/DL
EGFRCR SERPLBLD CKD-EPI 2021: 50 ML/MIN/1.73M2
EST. AVERAGE GLUCOSE BLD GHB EST-MCNC: 134 MG/DL
EST. AVERAGE GLUCOSE BLD GHB EST-SCNC: 7.4 MMOL/L
GLUCOSE SERPL-MCNC: 97 MG/DL (ref 65–99)
HBA1C MFR BLD: 6.3 %
HDLC SERPL-MCNC: 39 MG/DL
LDLC SERPL CALC-MCNC: 55 MG/DL (CALC)
MICROALBUMIN UR-MCNC: NORMAL
NONHDLC SERPL-MCNC: 74 MG/DL (CALC)
POTASSIUM SERPL-SCNC: 4.4 MMOL/L (ref 3.5–5.3)
PROT SERPL-MCNC: 7.2 G/DL (ref 6.1–8.1)
PSA SERPL-MCNC: 0.11 NG/ML
SODIUM SERPL-SCNC: 144 MMOL/L (ref 135–146)
TRIGL SERPL-MCNC: 102 MG/DL

## 2025-04-21 ENCOUNTER — APPOINTMENT (OUTPATIENT)
Dept: UROLOGY | Facility: HOSPITAL | Age: 75
End: 2025-04-21
Payer: MEDICARE

## 2025-04-21 PROBLEM — H34.8311: Status: ACTIVE | Noted: 2025-04-21

## 2025-04-21 PROBLEM — H34.8312 TRIBUTARY (BRANCH) RETINAL VEIN OCCLUSION, RIGHT EYE, STABLE: Status: ACTIVE | Noted: 2025-04-21

## 2025-04-21 PROBLEM — E11.3591 TYPE 2 DIABETES MELLITUS WITH RIGHT EYE AFFECTED BY PROLIFERATIVE RETINOPATHY WITHOUT MACULAR EDEMA, WITH LONG-TERM CURRENT USE OF INSULIN: Status: ACTIVE | Noted: 2025-04-21

## 2025-04-21 PROBLEM — Z79.4 TYPE 2 DIABETES MELLITUS WITH RIGHT EYE AFFECTED BY PROLIFERATIVE RETINOPATHY WITHOUT MACULAR EDEMA, WITH LONG-TERM CURRENT USE OF INSULIN: Status: ACTIVE | Noted: 2025-04-21

## 2025-04-21 LAB
ALBUMIN SERPL-MCNC: 4.1 G/DL (ref 3.6–5.1)
ALBUMIN/CREAT UR: 12 MG/G CREAT
ALP SERPL-CCNC: 62 U/L (ref 35–144)
ALT SERPL-CCNC: 13 U/L (ref 9–46)
ANION GAP SERPL CALCULATED.4IONS-SCNC: 12 MMOL/L (CALC) (ref 7–17)
AST SERPL-CCNC: 21 U/L (ref 10–35)
BILIRUB SERPL-MCNC: 0.4 MG/DL (ref 0.2–1.2)
BUN SERPL-MCNC: 27 MG/DL (ref 7–25)
CALCIUM SERPL-MCNC: 9.1 MG/DL (ref 8.6–10.3)
CHLORIDE SERPL-SCNC: 108 MMOL/L (ref 98–110)
CHOLEST SERPL-MCNC: 113 MG/DL
CHOLEST/HDLC SERPL: 2.9 (CALC)
CO2 SERPL-SCNC: 24 MMOL/L (ref 20–32)
CREAT SERPL-MCNC: 1.45 MG/DL (ref 0.7–1.28)
CREAT UR-MCNC: 130 MG/DL (ref 20–320)
EGFRCR SERPLBLD CKD-EPI 2021: 50 ML/MIN/1.73M2
EST. AVERAGE GLUCOSE BLD GHB EST-MCNC: 134 MG/DL
EST. AVERAGE GLUCOSE BLD GHB EST-SCNC: 7.4 MMOL/L
GLUCOSE SERPL-MCNC: 97 MG/DL (ref 65–99)
HBA1C MFR BLD: 6.3 %
HDLC SERPL-MCNC: 39 MG/DL
LDLC SERPL CALC-MCNC: 55 MG/DL (CALC)
MICROALBUMIN UR-MCNC: 1.6 MG/DL
NONHDLC SERPL-MCNC: 74 MG/DL (CALC)
POTASSIUM SERPL-SCNC: 4.4 MMOL/L (ref 3.5–5.3)
PROT SERPL-MCNC: 7.2 G/DL (ref 6.1–8.1)
PSA SERPL-MCNC: 0.11 NG/ML
SODIUM SERPL-SCNC: 144 MMOL/L (ref 135–146)
TRIGL SERPL-MCNC: 102 MG/DL

## 2025-04-21 ASSESSMENT — PATIENT HEALTH QUESTIONNAIRE - PHQ9
SUM OF ALL RESPONSES TO PHQ9 QUESTIONS 1 AND 2: 0
2. FEELING DOWN, DEPRESSED OR HOPELESS: NOT AT ALL
1. LITTLE INTEREST OR PLEASURE IN DOING THINGS: NOT AT ALL

## 2025-04-21 ASSESSMENT — ACTIVITIES OF DAILY LIVING (ADL)
DOING_HOUSEWORK: INDEPENDENT
TAKING_MEDICATION: INDEPENDENT
DRESSING: INDEPENDENT
MANAGING_FINANCES: INDEPENDENT
GROCERY_SHOPPING: INDEPENDENT
BATHING: INDEPENDENT

## 2025-04-21 ASSESSMENT — ENCOUNTER SYMPTOMS
OCCASIONAL FEELINGS OF UNSTEADINESS: 0
DEPRESSION: 0
LOSS OF SENSATION IN FEET: 0
CONSTITUTIONAL NEGATIVE: 1

## 2025-04-21 NOTE — PROGRESS NOTES
"Patient ID: Ismael Haley is a 74 y.o. male who presents for Medicare Annual Wellness Visit Subsequent and Follow-up.    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    All other systems reviewed and are negative.      Objective     Physical Exam  Vitals and nursing note reviewed.   Constitutional:       Appearance: Normal appearance. He is obese.   Neck:      Vascular: No carotid bruit.   Cardiovascular:      Rate and Rhythm: Normal rate and regular rhythm.      Pulses: Normal pulses.      Heart sounds: Normal heart sounds. No murmur heard.  Pulmonary:      Effort: Pulmonary effort is normal.      Breath sounds: Normal breath sounds.   Abdominal:      Palpations: There is no mass.   Musculoskeletal:      Right lower leg: No edema.      Left lower leg: No edema.   Skin:     Capillary Refill: Capillary refill takes more than 3 seconds.   Neurological:      General: No focal deficit present.      Mental Status: He is oriented to person, place, and time. Mental status is at baseline.   Psychiatric:         Mood and Affect: Mood normal.         Behavior: Behavior normal.         Thought Content: Thought content normal.         Judgment: Judgment normal.       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, with long-term current use of insulin    STABLE         Tributary (branch) retinal vein occlusion, right eye, with retinal neovascularization    STABLE         Tributary (branch) retinal vein occlusion, right eye, stable    STABLE          Other Visit Diagnoses         Medicare annual wellness visit, subsequent  (Chronic)   -  Primary               A/P      Trulicity prescription filled  Continue the same medication  Follow-up with me in 6 months time          Advance Care Planning Note     Discussion Date: 04/22/25   Discussion Participants: patient    The patient wishes to discuss Advance Care Planning today and the following is a brief summary of our discussion.     Patient has capacity to make their own medical decisions: Yes  Health Care Agent/Surrogate Decision Maker documented in chart: Yes    Documents on file and valid:  Advance Directive/Living Will: No   Health Care Power of : Yes  Other:     Communication of Medical Status/Prognosis:        Communication of Treatment Goals/Options:       Discussed with the patient end-of-life choices patient is presently full code he does not have a living will or healthcare power of  he will think about getting it done when ready will bring it on next office visit so that it can be scanned into the chart for the purpose of documentation    Treatment Decisions  Goals of  Care: survival is paramount regardless of prognosis, treatment outcome, or burden       Follow Up Plan    Team Members    Time Statement: Total face to face time spent on advance care planning was 5 minutes with 5 minutes spent in counseling, including the explanation.    Zarina Ricks MD  4/22/2025 9:30 AM

## 2025-04-22 ENCOUNTER — APPOINTMENT (OUTPATIENT)
Dept: PRIMARY CARE | Facility: CLINIC | Age: 75
End: 2025-04-22
Payer: MEDICARE

## 2025-04-22 VITALS
SYSTOLIC BLOOD PRESSURE: 122 MMHG | OXYGEN SATURATION: 97 % | HEART RATE: 61 BPM | BODY MASS INDEX: 28.61 KG/M2 | DIASTOLIC BLOOD PRESSURE: 70 MMHG | WEIGHT: 204.4 LBS | HEIGHT: 71 IN

## 2025-04-22 DIAGNOSIS — E11.3292 TYPE 2 DIABETES MELLITUS WITH LEFT EYE AFFECTED BY MILD NONPROLIFERATIVE RETINOPATHY WITHOUT MACULAR EDEMA, WITH LONG-TERM CURRENT USE OF INSULIN: ICD-10-CM

## 2025-04-22 DIAGNOSIS — E11.9 TYPE 2 DIABETES MELLITUS WITHOUT COMPLICATION, WITHOUT LONG-TERM CURRENT USE OF INSULIN: ICD-10-CM

## 2025-04-22 DIAGNOSIS — H34.8311 TRIBUTARY (BRANCH) RETINAL VEIN OCCLUSION, RIGHT EYE, WITH RETINAL NEOVASCULARIZATION: ICD-10-CM

## 2025-04-22 DIAGNOSIS — H34.8312 TRIBUTARY (BRANCH) RETINAL VEIN OCCLUSION, RIGHT EYE, STABLE: ICD-10-CM

## 2025-04-22 DIAGNOSIS — E66.01 MORBIDLY OBESE (MULTI): ICD-10-CM

## 2025-04-22 DIAGNOSIS — E11.3299 MILD NONPROLIFERATIVE DIABETIC RETINOPATHY WITHOUT MACULAR EDEMA ASSOCIATED WITH TYPE 2 DIABETES MELLITUS, UNSPECIFIED LATERALITY: ICD-10-CM

## 2025-04-22 DIAGNOSIS — E11.22 CKD STAGE 3 DUE TO TYPE 2 DIABETES MELLITUS (MULTI): ICD-10-CM

## 2025-04-22 DIAGNOSIS — Z79.4 TYPE 2 DIABETES MELLITUS WITH LEFT EYE AFFECTED BY MILD NONPROLIFERATIVE RETINOPATHY WITHOUT MACULAR EDEMA, WITH LONG-TERM CURRENT USE OF INSULIN: ICD-10-CM

## 2025-04-22 DIAGNOSIS — E78.5 HYPERLIPIDEMIA, UNSPECIFIED HYPERLIPIDEMIA TYPE: ICD-10-CM

## 2025-04-22 DIAGNOSIS — N18.30 CKD STAGE 3 DUE TO TYPE 2 DIABETES MELLITUS (MULTI): ICD-10-CM

## 2025-04-22 DIAGNOSIS — E11.49 TYPE 2 DIABETES MELLITUS WITH OTHER NEUROLOGIC COMPLICATION, WITHOUT LONG-TERM CURRENT USE OF INSULIN: ICD-10-CM

## 2025-04-22 DIAGNOSIS — I10 BENIGN ESSENTIAL HYPERTENSION: ICD-10-CM

## 2025-04-22 DIAGNOSIS — Z00.00 MEDICARE ANNUAL WELLNESS VISIT, SUBSEQUENT: Primary | Chronic | ICD-10-CM

## 2025-04-22 DIAGNOSIS — C61 MALIGNANT NEOPLASM OF PROSTATE (MULTI): ICD-10-CM

## 2025-04-22 DIAGNOSIS — N18.31 CHRONIC KIDNEY DISEASE, STAGE 3A (MULTI): ICD-10-CM

## 2025-04-22 DIAGNOSIS — E11.3591 TYPE 2 DIABETES MELLITUS WITH RIGHT EYE AFFECTED BY PROLIFERATIVE RETINOPATHY WITHOUT MACULAR EDEMA, WITH LONG-TERM CURRENT USE OF INSULIN: ICD-10-CM

## 2025-04-22 DIAGNOSIS — Z79.4 TYPE 2 DIABETES MELLITUS WITH RIGHT EYE AFFECTED BY PROLIFERATIVE RETINOPATHY WITHOUT MACULAR EDEMA, WITH LONG-TERM CURRENT USE OF INSULIN: ICD-10-CM

## 2025-04-22 PROCEDURE — G0439 PPPS, SUBSEQ VISIT: HCPCS | Performed by: INTERNAL MEDICINE

## 2025-04-22 PROCEDURE — 1160F RVW MEDS BY RX/DR IN RCRD: CPT | Performed by: INTERNAL MEDICINE

## 2025-04-22 PROCEDURE — 1170F FXNL STATUS ASSESSED: CPT | Performed by: INTERNAL MEDICINE

## 2025-04-22 PROCEDURE — 3078F DIAST BP <80 MM HG: CPT | Performed by: INTERNAL MEDICINE

## 2025-04-22 PROCEDURE — 1158F ADVNC CARE PLAN TLK DOCD: CPT | Performed by: INTERNAL MEDICINE

## 2025-04-22 PROCEDURE — 1123F ACP DISCUSS/DSCN MKR DOCD: CPT | Performed by: INTERNAL MEDICINE

## 2025-04-22 PROCEDURE — 99214 OFFICE O/P EST MOD 30 MIN: CPT | Performed by: INTERNAL MEDICINE

## 2025-04-22 PROCEDURE — 3074F SYST BP LT 130 MM HG: CPT | Performed by: INTERNAL MEDICINE

## 2025-04-22 PROCEDURE — 1159F MED LIST DOCD IN RCRD: CPT | Performed by: INTERNAL MEDICINE

## 2025-04-22 PROCEDURE — 1036F TOBACCO NON-USER: CPT | Performed by: INTERNAL MEDICINE

## 2025-04-22 RX ORDER — DULAGLUTIDE 1.5 MG/.5ML
1.5 INJECTION, SOLUTION SUBCUTANEOUS
Qty: 6 ML | Refills: 3 | Status: SHIPPED | OUTPATIENT
Start: 2025-04-27 | End: 2026-04-27

## 2025-04-22 RX ORDER — MAGNESIUM L-LACTATE 84 MG
500 TABLET, EXTENDED RELEASE ORAL DAILY
COMMUNITY

## 2025-04-22 RX ORDER — BLOOD-GLUCOSE CONTROL, NORMAL
EACH MISCELLANEOUS
Qty: 200 EACH | Refills: 2 | Status: SHIPPED | OUTPATIENT
Start: 2025-04-22

## 2025-04-22 ASSESSMENT — PATIENT HEALTH QUESTIONNAIRE - PHQ9
1. LITTLE INTEREST OR PLEASURE IN DOING THINGS: NOT AT ALL
SUM OF ALL RESPONSES TO PHQ9 QUESTIONS 1 AND 2: 0
2. FEELING DOWN, DEPRESSED OR HOPELESS: NOT AT ALL

## 2025-04-22 ASSESSMENT — ACTIVITIES OF DAILY LIVING (ADL)
DRESSING: INDEPENDENT
GROCERY_SHOPPING: INDEPENDENT
BATHING: INDEPENDENT
BATHING: INDEPENDENT
DRESSING: INDEPENDENT
TAKING_MEDICATION: INDEPENDENT
DOING_HOUSEWORK: INDEPENDENT
TAKING_MEDICATION: INDEPENDENT
MANAGING_FINANCES: INDEPENDENT
DOING_HOUSEWORK: INDEPENDENT
GROCERY_SHOPPING: INDEPENDENT
MANAGING_FINANCES: INDEPENDENT

## 2025-05-02 DIAGNOSIS — C61 MALIGNANT NEOPLASM OF PROSTATE (MULTI): ICD-10-CM

## 2025-05-04 LAB — PSA SERPL-MCNC: 0.17 NG/ML

## 2025-05-05 ENCOUNTER — OFFICE VISIT (OUTPATIENT)
Dept: UROLOGY | Facility: HOSPITAL | Age: 75
End: 2025-05-05
Payer: MEDICARE

## 2025-05-05 DIAGNOSIS — C61 MALIGNANT NEOPLASM OF PROSTATE (MULTI): ICD-10-CM

## 2025-05-05 PROCEDURE — 99214 OFFICE O/P EST MOD 30 MIN: CPT | Performed by: STUDENT IN AN ORGANIZED HEALTH CARE EDUCATION/TRAINING PROGRAM

## 2025-05-05 PROCEDURE — G2211 COMPLEX E/M VISIT ADD ON: HCPCS | Performed by: STUDENT IN AN ORGANIZED HEALTH CARE EDUCATION/TRAINING PROGRAM

## 2025-05-05 PROCEDURE — 1159F MED LIST DOCD IN RCRD: CPT | Performed by: STUDENT IN AN ORGANIZED HEALTH CARE EDUCATION/TRAINING PROGRAM

## 2025-05-05 NOTE — PROGRESS NOTES
UROLOGIC FOLLOW-UP VISIT     PROBLEM LIST:  1. Malignant neoplasm of prostate (Multi)  Prostate Specific Antigen, Screen        HISTORY OF PRESENT ILLNESS:   73 y/o male with hx of prostate cancer s/p ADT and EBRT, PSA levels have been undetectable. Patient presents today for surveillance appt. Most recent PSA in 010/30/23 was 0.12. Denies any complaints of urgency, frequency or incomplete emptying of the bladder. He has no complaints of nocturia. He has no complaints of recent weight loss, no bone pain and no recent onset of fatigue.     Interim History:   5/5/25: Patient is doing well overall. He denies any urinary issues, except for occasional hesitation. Most recent PSA in 05/03/25 was 0.17. He denies fevers, chills, nausea, vomiting.      PAST MEDICAL HISTORY:  Past Medical History:   Diagnosis Date    Essential (primary) hypertension 02/05/2014    Benign essential hypertension    Gastritis, unspecified, without bleeding 03/25/2021    Helicobacter pylori gastritis    Other conditions influencing health status     Calcific Bursitis    Other conditions influencing health status 03/11/2021    Uncontrolled diabetes with kidney complications    Personal history of other diseases of the musculoskeletal system and connective tissue     Personal history of gout    Personal history of other diseases of the musculoskeletal system and connective tissue 10/19/2016    History of gout    Personal history of other endocrine, nutritional and metabolic disease     History of diabetes mellitus       PAST SURGICAL HISTORY:  Past Surgical History:   Procedure Laterality Date    ELBOW SURGERY  10/11/2013    Elbow Surgery    OTHER SURGICAL HISTORY  03/07/2019    Finger surgical procedure        ALLERGIES:   Allergies   Allergen Reactions    Lisinopril Angioedema and Swelling     Swelling of lips        MEDICATIONS:     Current Outpatient Medications:     allopurinol (Zyloprim) 100 mg tablet, Take 2 tablets (200 mg) by mouth once  daily., Disp: 180 tablet, Rfl: 1    amLODIPine (Norvasc) 10 mg tablet, Take 1 tablet (10 mg) by mouth once daily., Disp: 90 tablet, Rfl: 2    ascorbic acid (Vitamin C) 100 mg tablet, Vitamin C 100 MG Oral Tablet Refills: 0, Disp: , Rfl:     aspirin 81 mg chewable tablet, Chew 1 tablet (81 mg) once daily., Disp: , Rfl:     brimonidine (AlphaGAN P) 0.2 % ophthalmic solution, Administer 1 drop into the right eye 2 times a day., Disp: , Rfl:     cholecalciferol (Vitamin D3) 50 mcg (2,000 unit) capsule, Vitamin D CAPS Refills: 0, Disp: , Rfl:     cyanocobalamin (Vitamin B-12) 1,000 mcg tablet, Take 1 tablet (1,000 mcg) by mouth once daily., Disp: 90 tablet, Rfl: 3    dorzolamide-timoloL (Cosopt) 22.3-6.8 mg/mL ophthalmic solution, Dorzolamide HCl-Timolol Mal 22.3-6.8 MG/ML Ophthalmic Solution Quantity: 20  Refills: 0  Start: 20-Mar-2018, Disp: , Rfl:     dulaglutide (Trulicity) 1.5 mg/0.5 mL pen injector injection, Inject 1.5 mg under the skin 1 (one) time per week., Disp: 6 mL, Rfl: 3    empagliflozin (Jardiance) 10 mg, Take 1 tablet (10 mg) by mouth once daily., Disp: 90 tablet, Rfl: 2    folic acid 0.8 mg capsule, Take 1 capsule by mouth once daily., Disp: , Rfl:     glipiZIDE (Glucotrol) 5 mg tablet, PT TAKING 2.5MG A DAY, Disp: 90 tablet, Rfl: 2    iron polysaccharides (Nu-Iron,Niferex) 150 mg iron capsule, Take 1 capsule (150 mg) by mouth once daily., Disp: 90 capsule, Rfl: 3    lancets 30 gauge misc, Use three times daily, Disp: 200 each, Rfl: 2    lancets 33 gauge misc, 3 times a day.  USE TO TEST BLOOD SUGAR THREE TIMES DAILY, Disp: , Rfl:     latanoprost (Xalatan) 0.005 % ophthalmic solution, Latanoprost 0.005 % Ophthalmic Solution Quantity: 7  Refills: 0  Start: 13-Feb-2017, Disp: , Rfl:     losartan (Cozaar) 100 mg tablet, Take 1 tablet (100 mg) by mouth once daily., Disp: 90 tablet, Rfl: 2    magnesium lactate CR (Magtab) 84 mg ER tablet, Take 500 mg by mouth once daily., Disp: , Rfl:      multivit-min/folic/vit K/lycop (MEN'S MULTIVITAMIN ORAL), Take 1 tablet by mouth once daily., Disp: , Rfl:     OneTouch Verio test strips strip, USE AS DIRECTED TO TEST THREE TIMES DAILY, Disp: 300 strip, Rfl: 0    rosuvastatin (Crestor) 5 mg tablet, Take 1 tablet (5 mg) by mouth once daily., Disp: 90 tablet, Rfl: 2    selenium 200 mcg tablet, Take by mouth.  TAKE AS DIRECTED. (Patient not taking: Reported on 10/24/2024), Disp: , Rfl:     zinc gluconate 50 mg tablet, Zinc 50 MG Oral Tablet Refills: 0, Disp: , Rfl:       SOCIAL HISTORY:  Patient  reports that he has never smoked. He has never used smokeless tobacco.   Social History     Socioeconomic History    Marital status:      Spouse name: Not on file    Number of children: Not on file    Years of education: Not on file    Highest education level: Not on file   Occupational History    Not on file   Tobacco Use    Smoking status: Never    Smokeless tobacco: Never   Substance and Sexual Activity    Alcohol use: Not on file    Drug use: Not on file    Sexual activity: Not on file   Other Topics Concern    Not on file   Social History Narrative    Not on file     Social Drivers of Health     Financial Resource Strain: Not on file   Food Insecurity: Not on file   Transportation Needs: Not on file   Physical Activity: Not on file   Stress: Not on file   Social Connections: Not on file   Intimate Partner Violence: Not on file   Housing Stability: Not on file       FAMILY HISTORY:  Family History   Problem Relation Name Age of Onset    Pneumonia Mother      Other (cardiac failure) Father      Hypertension Father      Other (ESRD) Brother      Diabetes Other multiple family members     Hypertension Other multiple family members      REVIEW OF SYSTEMS:   Constitutional: Negative for fever and chills. Denies anorexia, weight loss.  Eyes: Negative for visual disturbance.   Respiratory: Negative for shortness of breath.    Cardiovascular: Negative for chest pain.    Gastrointestinal: Negative for nausea and vomiting.   Genitourinary: See interval history above.  Skin: Negative for rash.   Neurological: Negative for dizziness and numbness.   Psychiatric/Behavioral: Negative for confusion and decreased concentration.     PHYSICAL EXAM:  There were no vitals taken for this visit.  Constitutional: Patient appears well-developed and well-nourished. No distress.    Head: Normocephalic and atraumatic.    Neck: Normal range of motion.    Cardiovascular: Normal rate.    Pulmonary/Chest: Effort normal. No respiratory distress.   Abdominal: soft NTND  Musculoskeletal: Normal range of motion.    Neurological: Alert and oriented to person, place, and time.  Psychiatric: Normal mood and affect. Behavior is normal. Thought content normal.      LABORATORY REVIEW:     Lab Results   Component Value Date    BUN 27 (H) 04/19/2025    CREATININE 1.45 (H) 04/19/2025    EGFR 50 (L) 04/19/2025     04/19/2025    K 4.4 04/19/2025     04/19/2025    CO2 24 04/19/2025    CALCIUM 9.1 04/19/2025      Lab Results   Component Value Date    WBC 3.8 01/29/2025    RBC 5.77 01/29/2025    HGB 15.6 01/29/2025    HCT 48.7 01/29/2025    MCV 84.4 01/29/2025    MCH 27.0 01/29/2025    MCHC 32.0 01/29/2025    RDW 15.8 (H) 01/29/2025     01/29/2025    MPV 10.5 01/29/2025        Lab Results   Component Value Date    PSA 0.17 05/03/2025    PSA 0.11 04/19/2025    PSA <0.10 04/29/2024    PSA 0.12 10/30/2023    PSA <0.10 04/17/2023    PSA <0.10 09/24/2022    PSA <0.10 04/12/2022    PSA <0.10 01/06/2022    PSA <0.10 09/07/2021    PSA <0.10 03/08/2021    PSA <0.10 11/06/2020    PSA <0.10 06/30/2020    PSA <0.10 04/22/2020    PSA <0.10 01/14/2020    PSA <0.10 11/13/2019    PSA <0.10 08/19/2019    PSA <0.10 05/24/2019    PSA <0.10 03/06/2019    PSA 0.91 12/10/2018    PSA 21.74 (H) 09/05/2018          Assessment:      No diagnosis found.     Plan:    Reviewed and interpreted patient's PSA level    Encouraged  patient to continue managing his DM to protect his kidney function   Continue current regimen   Patient will let us know if having any urinary issues   Follow up in 1 year or sooner if necessary.     31 minutes total spent on patient's care today; >50% time spent on counseling/coordination of care      Scribe Attestation  By signing my name below, Lisa BEDOLLA Scribe   attest that this documentation has been prepared under the direction and in the presence of Chano Adams MD.    Scribe Attestation  By signing my name below, Zuly BEDOLLA Scribe   attest that this documentation has been prepared under the direction and in the presence of Chano Adams MD.

## 2025-05-09 ENCOUNTER — DOCUMENTATION (OUTPATIENT)
Dept: PRIMARY CARE | Facility: CLINIC | Age: 75
End: 2025-05-09
Payer: MEDICARE

## 2025-05-15 ENCOUNTER — DOCUMENTATION (OUTPATIENT)
Dept: PRIMARY CARE | Facility: CLINIC | Age: 75
End: 2025-05-15
Payer: MEDICARE

## 2025-05-15 NOTE — PROGRESS NOTES
Returned Mr. Haley's call for [facility]. Unable to reach. Left  to return call.    8

## 2025-05-22 ENCOUNTER — PATIENT OUTREACH (OUTPATIENT)
Dept: PRIMARY CARE | Facility: CLINIC | Age: 75
End: 2025-05-22
Payer: MEDICARE

## 2025-05-22 DIAGNOSIS — E78.5 HYPERLIPIDEMIA, UNSPECIFIED HYPERLIPIDEMIA TYPE: ICD-10-CM

## 2025-05-22 DIAGNOSIS — I10 BENIGN ESSENTIAL HYPERTENSION: ICD-10-CM

## 2025-05-22 DIAGNOSIS — E11.3591 TYPE 2 DIABETES MELLITUS WITH RIGHT EYE AFFECTED BY PROLIFERATIVE RETINOPATHY WITHOUT MACULAR EDEMA, WITH LONG-TERM CURRENT USE OF INSULIN: ICD-10-CM

## 2025-05-22 DIAGNOSIS — Z79.4 TYPE 2 DIABETES MELLITUS WITH RIGHT EYE AFFECTED BY PROLIFERATIVE RETINOPATHY WITHOUT MACULAR EDEMA, WITH LONG-TERM CURRENT USE OF INSULIN: ICD-10-CM

## 2025-05-22 NOTE — PROGRESS NOTES
Chronic care management outreach complete. Mr. Haley reports that he is doing well. We reviewed his urology and pcp appointments last month. No questions or concerns.   Monitors bp and blood sugars. /72. Blood sugar this morning 88.   His goal is to be and maintain a weight under 200lbs.He maintains a healthy diet and works out 4x a [place] at the [place]. I an sending educational material to help with weight loss goals.  Medications reviewed. No need for refills. Compliant with medications  Up coming appointments reviewed.     Care Management Monthly Outreach  Chart review completed  Confirmation of at least 2 patient identifiers  Change in insurance? No    Has patient been to ER/Urgent Care since last outreach? 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 No  Refills needed? No    Social Drivers of Health: Deferred  Care Gaps Addressed? Deferred  Care Plan addressed: Yes    Upcoming Appointments:   Future Appointments       Date / Time Provider Department Dept Phone    6/16/2025 9:15 AM Elyse Santana MD [facility] [phone]    10/7/2025 8:50 AM (Arrive by 8:35 AM) Zarina Ricks MD [facility] [phone]    1/27/2026 9:00 AM (Arrive by 8:45 AM) Nilsa Ramirez PA-C [facility] [phone]    5/4/2026 1:30 PM Chano Adams MD [facility]           Blood Pressures Reviewed  BP Readings from Last 3 Encounters:   04/22/25 122/70   01/30/25 134/88   12/23/24 120/70     Labs Reviewed:  Lab Results   Component Value Date    CREATININE 1.45 (H)  04/19/2025    GLUCOSE 97 04/19/2025    ALKPHOS 62 04/19/2025    K 4.4 04/19/2025    PROT 7.2 04/19/2025     04/19/2025    CALCIUM 9.1 04/19/2025    AST 21 04/19/2025    ALT 13 04/19/2025    BUN 27 (H) 04/19/2025     09/03/2019    GFRMALE 42 (A) 01/30/2023     Lab Results   Component Value Date    TRIG 102 04/19/2025    CHOL 113 04/19/2025    LDLCALC 55 04/19/2025    HDL 39 (L) 04/19/2025     Lab Results   Component Value Date    HGBA1C 6.3 (H) 04/19/2025    HGBA1C 6.2 (H) 12/03/2024    HGBA1C 5.9 (H) 10/12/2024     Lab Results   Component Value Date    WBC 3.8 01/29/2025    RBC 5.77 01/29/2025    HGB 15.6 01/29/2025     01/29/2025   No other concerns at this time.  Agreeable to continue monthly outreaches.  Encouraged to call if questions or concerns arise.    Iraida Ronquillo

## 2025-06-05 DIAGNOSIS — E79.0 HYPERURICEMIA: ICD-10-CM

## 2025-06-06 DIAGNOSIS — I10 HYPERTENSION, UNSPECIFIED TYPE: ICD-10-CM

## 2025-06-06 RX ORDER — ALLOPURINOL 100 MG/1
200 TABLET ORAL DAILY
Qty: 180 TABLET | Refills: 1 | Status: SHIPPED | OUTPATIENT
Start: 2025-06-06

## 2025-06-08 RX ORDER — AMLODIPINE BESYLATE 10 MG/1
10 TABLET ORAL DAILY
Qty: 90 TABLET | Refills: 2 | Status: SHIPPED | OUTPATIENT
Start: 2025-06-08

## 2025-06-16 ENCOUNTER — APPOINTMENT (OUTPATIENT)
Dept: ENDOCRINOLOGY | Facility: CLINIC | Age: 75
End: 2025-06-16
Payer: MEDICARE

## 2025-06-26 ENCOUNTER — DOCUMENTATION (OUTPATIENT)
Dept: PRIMARY CARE | Facility: CLINIC | Age: 75
End: 2025-06-26
Payer: MEDICARE

## 2025-06-27 ENCOUNTER — PATIENT OUTREACH (OUTPATIENT)
Dept: PRIMARY CARE | Facility: CLINIC | Age: 75
End: 2025-06-27
Payer: MEDICARE

## 2025-06-27 DIAGNOSIS — I10 HYPERTENSION, UNSPECIFIED TYPE: ICD-10-CM

## 2025-06-27 DIAGNOSIS — Z79.4 TYPE 2 DIABETES MELLITUS WITH RIGHT EYE AFFECTED BY PROLIFERATIVE RETINOPATHY WITHOUT MACULAR EDEMA, WITH LONG-TERM CURRENT USE OF INSULIN: ICD-10-CM

## 2025-06-27 DIAGNOSIS — E11.3591 TYPE 2 DIABETES MELLITUS WITH RIGHT EYE AFFECTED BY PROLIFERATIVE RETINOPATHY WITHOUT MACULAR EDEMA, WITH LONG-TERM CURRENT USE OF INSULIN: ICD-10-CM

## 2025-06-27 NOTE — PROGRESS NOTES
Chronic care management outreach complete. Mr. Haley returned call for [facility] outreach. He reports that he is doing well.   Monitors blood pressure daily. Today's Bp 117/70 (58)  Blood sugar this morning 115. Trulicity days on Tuesdays.  He is on the wait list to reschedule his endocrinology appointment.   Medications reviewed. No need for refills at this time.  Care Management Monthly Outreach  Chart review completed  Confirmation of at least 2 patient identifiers  Change in insurance? No    Has patient been to ER/Urgent Care since last outreach? 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 No  Refills needed? No    Social Drivers of Health: Addressed in the last 6 months  Care Gaps Addressed? Addressed in the last 6 months  Care Plan addressed: Yes    Upcoming Appointments:   Future Appointments       Date / Time Provider Department Dept Phone    10/7/2025 8:50 AM (Arrive by 8:35 AM) Zarina Ricks MD [facility] [phone]    1/27/2026 9:00 AM (Arrive by 8:45 AM) Nilsa Ramirez PA-C [facility] [phone]    5/4/2026 1:30 PM Chano Adams MD [facility]           Blood Pressures Reviewed  BP Readings from Last 3 Encounters:   04/22/25 122/70   01/30/25 134/88   12/23/24 120/70     Labs Reviewed:  Lab Results   Component Value Date    CREATININE 1.45 (H) 04/19/2025    GLUCOSE 97 04/19/2025    ALKPHOS 62 04/19/2025    K 4.4 04/19/2025    PROT 7.2 04/19/2025     04/19/2025    CALCIUM 9.1 04/19/2025    AST 21 04/19/2025    ALT 13 04/19/2025    BUN 27 (H) 04/19/2025     09/03/2019    GFRMALE 42 (A)  01/30/2023     Lab Results   Component Value Date    TRIG 102 04/19/2025    CHOL 113 04/19/2025    LDLCALC 55 04/19/2025    HDL 39 (L) 04/19/2025     Lab Results   Component Value Date    HGBA1C 6.3 (H) 04/19/2025    HGBA1C 6.2 (H) 12/03/2024    HGBA1C 5.9 (H) 10/12/2024     Lab Results   Component Value Date    WBC 3.8 01/29/2025    RBC 5.77 01/29/2025    HGB 15.6 01/29/2025     01/29/2025   No other concerns at this time.  Agreeable to continue monthly outreaches.  Encouraged to call if questions or concerns arise.    Iraida Ronquillo

## 2025-08-08 ENCOUNTER — DOCUMENTATION (OUTPATIENT)
Dept: PRIMARY CARE | Facility: CLINIC | Age: 75
End: 2025-08-08
Payer: MEDICARE

## 2025-08-13 ENCOUNTER — PATIENT OUTREACH (OUTPATIENT)
Dept: PRIMARY CARE | Facility: CLINIC | Age: 75
End: 2025-08-13
Payer: MEDICARE

## 2025-08-13 DIAGNOSIS — Z79.4 TYPE 2 DIABETES MELLITUS WITH RIGHT EYE AFFECTED BY PROLIFERATIVE RETINOPATHY WITHOUT MACULAR EDEMA, WITH LONG-TERM CURRENT USE OF INSULIN: ICD-10-CM

## 2025-08-13 DIAGNOSIS — I10 HYPERTENSION, UNSPECIFIED TYPE: ICD-10-CM

## 2025-08-13 DIAGNOSIS — E78.5 HYPERLIPIDEMIA, UNSPECIFIED HYPERLIPIDEMIA TYPE: ICD-10-CM

## 2025-08-13 DIAGNOSIS — E11.3591 TYPE 2 DIABETES MELLITUS WITH RIGHT EYE AFFECTED BY PROLIFERATIVE RETINOPATHY WITHOUT MACULAR EDEMA, WITH LONG-TERM CURRENT USE OF INSULIN: ICD-10-CM

## 2025-08-24 DIAGNOSIS — E78.5 HYPERLIPIDEMIA, UNSPECIFIED HYPERLIPIDEMIA TYPE: ICD-10-CM

## 2025-08-24 RX ORDER — ROSUVASTATIN CALCIUM 5 MG/1
5 TABLET, COATED ORAL DAILY
Qty: 90 TABLET | Refills: 0 | Status: SHIPPED | OUTPATIENT
Start: 2025-08-24

## 2025-10-07 ENCOUNTER — APPOINTMENT (OUTPATIENT)
Dept: PRIMARY CARE | Facility: CLINIC | Age: 75
End: 2025-10-07
Payer: MEDICARE